# Patient Record
Sex: MALE | Race: WHITE | Employment: OTHER | URBAN - METROPOLITAN AREA
[De-identification: names, ages, dates, MRNs, and addresses within clinical notes are randomized per-mention and may not be internally consistent; named-entity substitution may affect disease eponyms.]

---

## 2017-01-17 ENCOUNTER — HOSPITAL ENCOUNTER (OUTPATIENT)
Dept: GENERAL RADIOLOGY | Facility: HOSPITAL | Age: 62
Discharge: HOME | End: 2017-01-17
Attending: NEUROLOGICAL SURGERY
Payer: COMMERCIAL

## 2017-01-17 DIAGNOSIS — Z98.1 STATUS POST CERVICAL SPINAL FUSION: ICD-10-CM

## 2017-01-17 PROCEDURE — 72040 X-RAY EXAM NECK SPINE 2-3 VW: CPT

## 2017-03-15 ENCOUNTER — MED REC SCAN ONLY (OUTPATIENT)
Dept: FAMILY MEDICINE CLINIC | Facility: CLINIC | Age: 62
End: 2017-03-15

## 2017-03-29 RX ORDER — LISINOPRIL AND HYDROCHLOROTHIAZIDE 20; 12.5 MG/1; MG/1
TABLET ORAL
Qty: 90 TABLET | Refills: 0 | Status: SHIPPED | OUTPATIENT
Start: 2017-03-29 | End: 2017-06-27

## 2017-03-29 NOTE — TELEPHONE ENCOUNTER
Last refill: 9- #90 with 1 refill    Last Visit: 10-    Next Visit: No future appointments. Forward to Dr. Raoul Lucas please advise on refills. Thanks.

## 2017-04-27 RX ORDER — ATORVASTATIN CALCIUM 10 MG/1
TABLET, FILM COATED ORAL
Qty: 30 TABLET | Refills: 6 | Status: SHIPPED | OUTPATIENT
Start: 2017-04-27 | End: 2017-12-06

## 2017-04-27 NOTE — TELEPHONE ENCOUNTER
Last refill on 10 31 2016 #30 with 5 refills  Last Lipid Panel on 2016  Total: 185  Tri  HDL: 79  LDL; 93

## 2017-05-26 ENCOUNTER — TELEPHONE (OUTPATIENT)
Dept: FAMILY MEDICINE CLINIC | Facility: CLINIC | Age: 62
End: 2017-05-26

## 2017-06-05 ENCOUNTER — MED REC SCAN ONLY (OUTPATIENT)
Dept: FAMILY MEDICINE CLINIC | Facility: CLINIC | Age: 62
End: 2017-06-05

## 2017-06-28 RX ORDER — LISINOPRIL AND HYDROCHLOROTHIAZIDE 20; 12.5 MG/1; MG/1
TABLET ORAL
Qty: 90 TABLET | Refills: 1 | Status: SHIPPED | OUTPATIENT
Start: 2017-06-28 | End: 2017-12-06

## 2017-08-16 ENCOUNTER — OFFICE VISIT (OUTPATIENT)
Dept: FAMILY MEDICINE CLINIC | Facility: CLINIC | Age: 62
End: 2017-08-16

## 2017-08-16 ENCOUNTER — HOSPITAL ENCOUNTER (OUTPATIENT)
Dept: GENERAL RADIOLOGY | Age: 62
Discharge: HOME | End: 2017-08-16
Attending: FAMILY MEDICINE
Payer: COMMERCIAL

## 2017-08-16 VITALS
DIASTOLIC BLOOD PRESSURE: 82 MMHG | HEART RATE: 80 BPM | WEIGHT: 213.19 LBS | SYSTOLIC BLOOD PRESSURE: 132 MMHG | HEIGHT: 65 IN | RESPIRATION RATE: 16 BRPM | BODY MASS INDEX: 35.52 KG/M2 | TEMPERATURE: 98 F

## 2017-08-16 DIAGNOSIS — M54.6 THORACIC BACK PAIN, UNSPECIFIED BACK PAIN LATERALITY, UNSPECIFIED CHRONICITY: Primary | ICD-10-CM

## 2017-08-16 DIAGNOSIS — M54.6 THORACIC BACK PAIN, UNSPECIFIED BACK PAIN LATERALITY, UNSPECIFIED CHRONICITY: ICD-10-CM

## 2017-08-16 PROCEDURE — 99214 OFFICE O/P EST MOD 30 MIN: CPT | Performed by: FAMILY MEDICINE

## 2017-08-16 PROCEDURE — 72072 X-RAY EXAM THORAC SPINE 3VWS: CPT | Performed by: FAMILY MEDICINE

## 2017-08-16 RX ORDER — MELOXICAM 15 MG/1
15 TABLET ORAL DAILY
Qty: 30 TABLET | Refills: 0 | Status: SHIPPED | OUTPATIENT
Start: 2017-08-16 | End: 2018-03-22 | Stop reason: ALTCHOICE

## 2017-08-16 RX ORDER — TRAMADOL HYDROCHLORIDE 50 MG/1
50 TABLET ORAL EVERY 8 HOURS PRN
Qty: 30 TABLET | Refills: 0 | Status: SHIPPED
Start: 2017-08-16 | End: 2018-03-22 | Stop reason: ALTCHOICE

## 2017-08-16 RX ORDER — CYCLOBENZAPRINE HCL 10 MG
10 TABLET ORAL 3 TIMES DAILY PRN
Qty: 30 TABLET | Refills: 0 | Status: SHIPPED
Start: 2017-08-16 | End: 2017-09-05

## 2017-08-16 NOTE — PROGRESS NOTES
María Kaur is a 65year old male.     CC:  Patient presents with:  Back Pain: per patient      HPI:  Chief Complaint: Thoracic Back Pain  Duration:  3 Month(s)  Severity: severe  Cause/ Description of Injury: unknown  Radiation of pain: up and down tablet Rfl: 6   Sildenafil Citrate 20 MG Oral Tab Take 1 tablet by mouth daily as needed.  Disp:  Rfl: 3        History:  Past Medical History:   Diagnosis Date   • Adenomatous colon polyp    • Anesthesia complication     woke up during propofol   • Arthrit Packs/day: 0.50      Years: 4.00         Quit date: 9/2/2012  Smokeless tobacco: Never Used                      Comment: social  Alcohol use: Yes           8.4 oz/week     Cans of beer: 14 per week     Comment: 1 be is moderate multilevel degenerative disc disease with multilevel anterior bridging osteophytes. There is also suggestion of superimposed DISH. PARASPINOUS:  Negative.  No paraspinous abnormality is seen.    OTHER:  Negative.           Impression     CONCLU

## 2017-08-17 ENCOUNTER — MED REC SCAN ONLY (OUTPATIENT)
Dept: FAMILY MEDICINE CLINIC | Facility: CLINIC | Age: 62
End: 2017-08-17

## 2017-08-17 ENCOUNTER — TELEPHONE (OUTPATIENT)
Dept: FAMILY MEDICINE CLINIC | Facility: CLINIC | Age: 62
End: 2017-08-17

## 2017-08-17 NOTE — TELEPHONE ENCOUNTER
Patient called to request a copy of his thoracic spine x-ray and the report. DOS 8/16/2017. Patient was called and informed that a disc was made with copies of the images and a copy of the report as well and left up front for him to .  He expressed u

## 2017-08-24 ENCOUNTER — TELEPHONE (OUTPATIENT)
Dept: FAMILY MEDICINE CLINIC | Facility: CLINIC | Age: 62
End: 2017-08-24

## 2017-08-24 NOTE — TELEPHONE ENCOUNTER
Received a request for medical records from  [address] [place] [address] for records from 01-01-16 to present. Request sent to [facility].

## 2017-08-31 ENCOUNTER — OFFICE VISIT (OUTPATIENT)
Dept: FAMILY MEDICINE CLINIC | Facility: CLINIC | Age: 62
End: 2017-08-31

## 2017-08-31 VITALS
DIASTOLIC BLOOD PRESSURE: 60 MMHG | HEART RATE: 82 BPM | BODY MASS INDEX: 36 KG/M2 | WEIGHT: 218.38 LBS | TEMPERATURE: 97 F | SYSTOLIC BLOOD PRESSURE: 112 MMHG | RESPIRATION RATE: 12 BRPM

## 2017-08-31 DIAGNOSIS — M47.814 OSTEOARTHRITIS OF THORACIC SPINE, UNSPECIFIED SPINAL OSTEOARTHRITIS COMPLICATION STATUS: ICD-10-CM

## 2017-08-31 DIAGNOSIS — M54.6 THORACIC BACK PAIN, UNSPECIFIED BACK PAIN LATERALITY, UNSPECIFIED CHRONICITY: Primary | ICD-10-CM

## 2017-08-31 PROCEDURE — 99213 OFFICE O/P EST LOW 20 MIN: CPT | Performed by: FAMILY MEDICINE

## 2017-08-31 NOTE — PROGRESS NOTES
Angela Nuñez is a 65year old male. CC:  Patient presents with:  Back Pain: per pt       HPI:  F/u thoracic back pain. He gained some relief with meds.    Allergies:  No Known Allergies   Current Meds:    Current Outpatient Prescriptions:  [place] SPINE SURGERY PROCEDURE UNLISTED      Comment: cervical fusion 4-7x2, lumbar fusions x1,                laminectomies x3  48/76/8587: UMBILICAL HERNIA REPAIR N/A      Comment: Procedure:  HERNIA UMBILICAL REPAIR ADULT;                 Surgeon: Gaurav Gomez para thoracic tenderness  NEURO: not examined     ASSESSMENT AND PLAN    1. Thoracic back pain, unspecified back pain laterality, unspecified chronicity  Prn use of NSAIDs, Pain meds and muscle relaxers  Trial of Capsaicin gel    2.  Osteoarthritis of thora

## 2017-09-07 ENCOUNTER — TELEPHONE (OUTPATIENT)
Dept: FAMILY MEDICINE CLINIC | Facility: CLINIC | Age: 62
End: 2017-09-07

## 2017-09-07 NOTE — TELEPHONE ENCOUNTER
Received request for med records from 06/01/2017 to present from [facility], Adjudicator: Carlos Manuel Robles- Phone [phone] ext [phone]. This is regarding pt's appeal to their decision about disability benefits.   Forward to [employer] today 09/07/17

## 2017-09-14 ENCOUNTER — TELEPHONE (OUTPATIENT)
Dept: FAMILY MEDICINE CLINIC | Facility: CLINIC | Age: 62
End: 2017-09-14

## 2017-09-14 NOTE — TELEPHONE ENCOUNTER
Received in today's mail, follow up to previous request for med records, this is 2nd request.  From [facility] Determination. Faxed to Med recs today and forwarded to scan [employer]. Copied for our files and sent to scanning.

## 2017-10-18 ENCOUNTER — HOSPITAL ENCOUNTER (OUTPATIENT)
Dept: GENERAL RADIOLOGY | Facility: HOSPITAL | Age: 62
Discharge: HOME | End: 2017-10-18
Attending: NEUROLOGICAL SURGERY
Payer: COMMERCIAL

## 2017-10-18 DIAGNOSIS — Z09 FOLLOW UP: ICD-10-CM

## 2017-10-18 PROCEDURE — 72052 X-RAY EXAM NECK SPINE 6/>VWS: CPT | Performed by: NEUROLOGICAL SURGERY

## 2017-12-05 ENCOUNTER — MED REC SCAN ONLY (OUTPATIENT)
Dept: FAMILY MEDICINE CLINIC | Facility: CLINIC | Age: 62
End: 2017-12-05

## 2017-12-06 ENCOUNTER — TELEPHONE (OUTPATIENT)
Dept: FAMILY MEDICINE CLINIC | Facility: CLINIC | Age: 62
End: 2017-12-06

## 2017-12-06 RX ORDER — ATORVASTATIN CALCIUM 10 MG/1
TABLET, FILM COATED ORAL
Qty: 30 TABLET | Refills: 0 | OUTPATIENT
Start: 2017-12-06

## 2017-12-06 RX ORDER — ATORVASTATIN CALCIUM 10 MG/1
TABLET, FILM COATED ORAL
Qty: 90 TABLET | Refills: 2 | Status: SHIPPED | OUTPATIENT
Start: 2017-12-06 | End: 2017-12-13

## 2017-12-06 RX ORDER — LISINOPRIL AND HYDROCHLOROTHIAZIDE 20; 12.5 MG/1; MG/1
TABLET ORAL
Qty: 90 TABLET | Refills: 2 | Status: SHIPPED | OUTPATIENT
Start: 2017-12-06 | End: 2018-08-09

## 2017-12-06 NOTE — TELEPHONE ENCOUNTER
PT WOULD LIKE REFILLS OF    LISINOPRIL-HYDROCHLOROTHIAZIDE 20-12.5 MG Oral Tab    AND     ATORVASTATIN 10 MG Oral Tab    PLEASE SEND TO [facility]  #[phone]

## 2017-12-12 NOTE — TELEPHONE ENCOUNTER
Last OV 8/31/17  Last lab 11/29/16  Last refilled 12/6/17 to mail order  Patient requesting 30 day script to local

## 2017-12-13 RX ORDER — ATORVASTATIN CALCIUM 10 MG/1
TABLET, FILM COATED ORAL
Qty: 30 TABLET | Refills: 0 | Status: SHIPPED | OUTPATIENT
Start: 2017-12-13 | End: 2018-08-10

## 2018-01-31 ENCOUNTER — MED REC SCAN ONLY (OUTPATIENT)
Dept: FAMILY MEDICINE CLINIC | Facility: CLINIC | Age: 63
End: 2018-01-31

## 2018-03-21 ENCOUNTER — TELEPHONE (OUTPATIENT)
Dept: FAMILY MEDICINE CLINIC | Facility: CLINIC | Age: 63
End: 2018-03-21

## 2018-03-21 ENCOUNTER — OFFICE VISIT (OUTPATIENT)
Dept: FAMILY MEDICINE CLINIC | Facility: CLINIC | Age: 63
End: 2018-03-21

## 2018-03-21 VITALS
HEART RATE: 72 BPM | WEIGHT: 220 LBS | BODY MASS INDEX: 36.65 KG/M2 | SYSTOLIC BLOOD PRESSURE: 128 MMHG | TEMPERATURE: 98 F | RESPIRATION RATE: 16 BRPM | HEIGHT: 65 IN | DIASTOLIC BLOOD PRESSURE: 80 MMHG

## 2018-03-21 DIAGNOSIS — L02.91 ABSCESS: Primary | ICD-10-CM

## 2018-03-21 DIAGNOSIS — M48.10 DISH (DIFFUSE IDIOPATHIC SKELETAL HYPEROSTOSIS): ICD-10-CM

## 2018-03-21 PROCEDURE — 99214 OFFICE O/P EST MOD 30 MIN: CPT | Performed by: FAMILY MEDICINE

## 2018-03-21 RX ORDER — AMOXICILLIN AND CLAVULANATE POTASSIUM 500; 125 MG/1; MG/1
TABLET, FILM COATED ORAL
Qty: 20 TABLET | Refills: 0 | Status: SHIPPED | OUTPATIENT
Start: 2018-03-21 | End: 2018-03-31

## 2018-03-21 RX ORDER — HYDROCODONE BITARTRATE AND ACETAMINOPHEN 5; 325 MG/1; MG/1
1 TABLET ORAL EVERY 8 HOURS PRN
Qty: 20 TABLET | Refills: 0 | Status: SHIPPED | OUTPATIENT
Start: 2018-03-21 | End: 2018-11-14

## 2018-03-21 NOTE — PROGRESS NOTES
Randee Bermudez is a 65year old male. CC:  Patient presents with:  Abscess (integumentary): inside of right thigh near groin per pt      HPI:  Boil in R groin region for a few days. He notes pain, redness and swelling. He denies fevers.     He also COLECTOMY      Comment: sigmoid, due to diverticular disease  No date: COLONOSCOPY  3/9/2016: COLONOSCOPY,DIAGNOSTIC N/A      Comment: Procedure: Janelle Dior POLYPECTOMY;  Surgeon: Paula Chavarria DO; S3, no S4; no click; murmur negative  PULM: clear to auscultation B, no accessory muscle use  GI: not examined  PSYCH: alert and oriented x 3; affect appropriate  SKIN: non fluctuant abscess in the R groin with surrounding erythema, edema and associated te

## 2018-03-21 NOTE — TELEPHONE ENCOUNTER
I spoke to the pt and made him an appt.      Future Appointments  Date Time Provider Teresa Paris   3/22/2018 10:00 AM DO SUSHMA Oscar

## 2018-03-21 NOTE — TELEPHONE ENCOUNTER
----- Message from Pinky Ashford MD sent at 3/21/2018  1:20 PM CDT -----  González Mcknight there,  Mr Jennyfer Anderson has a nice abscess in the R inguinal region. I am not comfortable incising this given the important underlying structures.  Can [facility] Members see him in the near future

## 2018-03-22 ENCOUNTER — OFFICE VISIT (OUTPATIENT)
Dept: SURGERY | Facility: CLINIC | Age: 63
End: 2018-03-22

## 2018-03-22 VITALS — SYSTOLIC BLOOD PRESSURE: 152 MMHG | DIASTOLIC BLOOD PRESSURE: 74 MMHG | TEMPERATURE: 98 F

## 2018-03-22 DIAGNOSIS — L02.214 ABSCESS, GROIN: Primary | ICD-10-CM

## 2018-03-22 PROCEDURE — 10060 I&D ABSCESS SIMPLE/SINGLE: CPT | Performed by: SURGERY

## 2018-03-22 PROCEDURE — 99242 OFF/OP CONSLTJ NEW/EST SF 20: CPT | Performed by: SURGERY

## 2018-03-22 NOTE — H&P
Chepe Madrid is a 65year old male  Patient presents with:  Consult: right inguinal abscess per Dr. Rinku Resendiz    Patient presents with right groin swelling for approximately 1 week.   Patient states that approximately 2 weeks ago he had date:  VASECTOMY    Current Outpatient Prescriptions on File Prior to Visit:  Amoxicillin-Pot Clavulanate (AUGMENTIN) 500-125 MG Oral Tab 1 tab bid x 10 days with food Disp: 20 tablet Rfl: 0   HYDROcodone-acetaminophen (NORCO) 5-325 MG Oral Tab Take 1 table heartburn  GENITAL/: no dysuria, urgency or frequency, no tea colored urine  MUSCULOSKELETAL: no joint complaints upper or lower extremities  HEMATOLOGY: denies hx anemia; denies bruising or excessive bleeding  Endocrine: no weight gain or loss no hot or wound irrigated and packed with quarter inch iodoform gauze sterile dressing applied      Meds & Refills for this Visit:  No prescriptions requested or ordered in this encounter    Imaging & Consults:  None

## 2018-06-18 ENCOUNTER — MED REC SCAN ONLY (OUTPATIENT)
Dept: FAMILY MEDICINE CLINIC | Facility: CLINIC | Age: 63
End: 2018-06-18

## 2018-08-10 RX ORDER — LISINOPRIL AND HYDROCHLOROTHIAZIDE 20; 12.5 MG/1; MG/1
TABLET ORAL
Qty: 90 TABLET | Refills: 0 | Status: SHIPPED | OUTPATIENT
Start: 2018-08-10 | End: 2018-11-08

## 2018-08-10 RX ORDER — ATORVASTATIN CALCIUM 10 MG/1
TABLET, FILM COATED ORAL
Qty: 90 TABLET | Refills: 0 | Status: SHIPPED | OUTPATIENT
Start: 2018-08-10 | End: 2018-11-08

## 2018-08-10 NOTE — TELEPHONE ENCOUNTER
Left detailed message advising scripts sent and he needs to set up wellness with Dr Edmundo Lorenzo and fasting lab work. Ok per  [employer] form. Advised office phone number to schedule.

## 2018-08-10 NOTE — TELEPHONE ENCOUNTER
Lisinopril hctz ast refilled on 12/6/17 for # 90 with 2 refills  Atorvastatin refilled 12/13/17 #30 with 0 refills  Last BUN 17, creatinine 1.16 on 10/28/16  Last seen on 3/21/18, /74  No future appointments. Thank you.

## 2018-08-10 NOTE — TELEPHONE ENCOUNTER
Please let patient know or leave message that his BP and lipid meds were refilled. It has been some time since we have done a full px and full panel of fasting labs on him.   Please schedule him for a wellness exam in the next month or so and come fasting for labs  Thanks

## 2018-11-08 RX ORDER — ATORVASTATIN CALCIUM 10 MG/1
TABLET, FILM COATED ORAL
Qty: 90 TABLET | Refills: 0 | Status: SHIPPED | OUTPATIENT
Start: 2018-11-08 | End: 2019-02-06

## 2018-11-08 RX ORDER — LISINOPRIL AND HYDROCHLOROTHIAZIDE 20; 12.5 MG/1; MG/1
TABLET ORAL
Qty: 90 TABLET | Refills: 0 | Status: SHIPPED | OUTPATIENT
Start: 2018-11-08 | End: 2019-02-06

## 2018-11-08 NOTE — TELEPHONE ENCOUNTER
Patient advised of the information per Dr. Fortino Madrid. Patient states that he will call the office back and set up an appointment.

## 2018-11-08 NOTE — TELEPHONE ENCOUNTER
Please let patient know or leave message that his BP and HTN meds were refilled. I have not seen him in > 1 year. Please set up a wellness exam in the near future and have him come fasting for labs at that time.  Thanks

## 2018-11-08 NOTE — TELEPHONE ENCOUNTER
Last refill on Atorvastatin #90 with 0 refills on 8 10 2018   Last refill on Lisinopril #90 with 0 refills on 810 2018  Last OV on 3 21 2018   No future appointments scheduled

## 2018-11-19 ENCOUNTER — MED REC SCAN ONLY (OUTPATIENT)
Dept: FAMILY MEDICINE CLINIC | Facility: CLINIC | Age: 63
End: 2018-11-19

## 2018-12-07 ENCOUNTER — OFFICE VISIT (OUTPATIENT)
Dept: FAMILY MEDICINE CLINIC | Facility: CLINIC | Age: 63
End: 2018-12-07
Payer: MEDICARE

## 2018-12-07 ENCOUNTER — OFFICE VISIT (OUTPATIENT)
Dept: SURGERY | Facility: CLINIC | Age: 63
End: 2018-12-07
Payer: MEDICARE

## 2018-12-07 VITALS — WEIGHT: 219 LBS | HEIGHT: 65.5 IN | TEMPERATURE: 99 F | HEART RATE: 64 BPM | BODY MASS INDEX: 36.05 KG/M2

## 2018-12-07 VITALS
OXYGEN SATURATION: 98 % | HEIGHT: 65.5 IN | HEART RATE: 62 BPM | DIASTOLIC BLOOD PRESSURE: 84 MMHG | SYSTOLIC BLOOD PRESSURE: 132 MMHG | WEIGHT: 219 LBS | TEMPERATURE: 98 F | BODY MASS INDEX: 36.05 KG/M2

## 2018-12-07 DIAGNOSIS — E78.5 HYPERLIPIDEMIA, UNSPECIFIED HYPERLIPIDEMIA TYPE: ICD-10-CM

## 2018-12-07 DIAGNOSIS — I10 ESSENTIAL HYPERTENSION: ICD-10-CM

## 2018-12-07 DIAGNOSIS — K59.00 CONSTIPATION, UNSPECIFIED CONSTIPATION TYPE: ICD-10-CM

## 2018-12-07 DIAGNOSIS — R10.30 LOWER ABDOMINAL PAIN: ICD-10-CM

## 2018-12-07 DIAGNOSIS — R19.4 CHANGE IN BOWEL HABITS: Primary | ICD-10-CM

## 2018-12-07 DIAGNOSIS — R77.8 ELEVATED TOTAL PROTEIN: ICD-10-CM

## 2018-12-07 DIAGNOSIS — N50.812 LEFT TESTICULAR PAIN: ICD-10-CM

## 2018-12-07 DIAGNOSIS — Z00.00 MEDICARE ANNUAL WELLNESS VISIT, INITIAL: Primary | ICD-10-CM

## 2018-12-07 DIAGNOSIS — N50.89 TESTICLE LUMP: ICD-10-CM

## 2018-12-07 DIAGNOSIS — C61 PROSTATE CANCER (HCC): ICD-10-CM

## 2018-12-07 LAB — PROSTATE SPECIFIC AG: <0.01 NG/ML

## 2018-12-07 PROCEDURE — 85027 COMPLETE CBC AUTOMATED: CPT | Performed by: FAMILY MEDICINE

## 2018-12-07 PROCEDURE — 80053 COMPREHEN METABOLIC PANEL: CPT | Performed by: FAMILY MEDICINE

## 2018-12-07 PROCEDURE — 99214 OFFICE O/P EST MOD 30 MIN: CPT | Performed by: SURGERY

## 2018-12-07 PROCEDURE — 90686 IIV4 VACC NO PRSV 0.5 ML IM: CPT | Performed by: FAMILY MEDICINE

## 2018-12-07 PROCEDURE — 80061 LIPID PANEL: CPT | Performed by: FAMILY MEDICINE

## 2018-12-07 PROCEDURE — 36415 COLL VENOUS BLD VENIPUNCTURE: CPT | Performed by: FAMILY MEDICINE

## 2018-12-07 PROCEDURE — G0402 INITIAL PREVENTIVE EXAM: HCPCS | Performed by: FAMILY MEDICINE

## 2018-12-07 PROCEDURE — 84443 ASSAY THYROID STIM HORMONE: CPT | Performed by: FAMILY MEDICINE

## 2018-12-07 PROCEDURE — 90670 PCV13 VACCINE IM: CPT | Performed by: FAMILY MEDICINE

## 2018-12-07 PROCEDURE — G0008 ADMIN INFLUENZA VIRUS VAC: HCPCS | Performed by: FAMILY MEDICINE

## 2018-12-07 PROCEDURE — G0009 ADMIN PNEUMOCOCCAL VACCINE: HCPCS | Performed by: FAMILY MEDICINE

## 2018-12-07 NOTE — PROGRESS NOTES
Ade Player is a 59year old male.     CC:  Patient presents with:  Physical: per pt -medicare      HPI:  Yearly PX  Last lipid: see below  Last PSA:  See below    Component      Latest Ref Rng & Units 11/29/2016   CHOLESTEROL, TOTAL      <200 mg/dL or a female age 47-67, do you take aspirin?: Yes    Have you had any immunizations at another office such as Influenza, Hepatitis B, Tetanus, or Pneumococcal?: No     Functional Ability     Bathing or Showering: Able without help    Toileting: Able without Medications:  LISINOPRIL-HYDROCHLOROTHIAZIDE 20-12.5 MG Oral Tab TAKE 1 TABLET EVERY MORNING Disp: 90 tablet Rfl: 0   ATORVASTATIN 10 MG Oral Tab TAKE 1 TABLET EVERY EVENING Disp: 90 tablet Rfl: 0   Capsaicin 0.075 % External Gel Apply to affected area bid Family Status   Relation Status   • Fa  at age 68        Heart disease   • Mo Alive      Social History    Tobacco Use      Smoking status: Former Smoker        Packs/day: 0.50        Years: 4.00        Pack years: 2        Quit date: 2012 meeting with a family  to draft a living will and health care power of   Discussed strategies to minimize risk of falls  Await results   Colonoscopy in 2021, sooner if deemed necessary by his Surgeon  - VENIPUNCTURE  - CBC, PLATELET; NO DIF 12/7/2019      TSH W Reflex To Free T4 [E]          Standing Status: Future          Standing Expiration Date: 12/7/2019      Flulaval 6 months and older 0.5 ml Quad PF [08354]      Pneumococcal (Prevnar 13) (16601) (DX V03.82/Z23)      *Venipuncture

## 2018-12-07 NOTE — H&P
Chauncey Rodgers is a 59year old male  Patient presents with:  Abdominal Pain: Est patient, History of colon resection done 2014. c/o lower abdominal pain and constipation--took stool softner. Denies any nausea or vomiting.  Last colon colonoscopy done 20 Outpatient Medications on File Prior to Visit:  LISINOPRIL-HYDROCHLOROTHIAZIDE 20-12.5 MG Oral Tab TAKE 1 TABLET EVERY MORNING Disp: 90 tablet Rfl: 0   ATORVASTATIN 10 MG Oral Tab TAKE 1 TABLET EVERY EVENING Disp: 90 tablet Rfl: 0   Capsaicin 0.075 % Exter intolerance    EXAM     Pulse 64, temperature 98.7 °F (37.1 °C), temperature source Oral, height 5' 5.5\" (1.664 m), weight 219 lb (99.3 kg). GENERAL: well developed, well nourished male, in no apparent distress.     MENTAL STATUS :Alert, oriented x 3  PSY National     Cholesterol Education III Guidelines:     Desirable:         < 130 mg/dL     Borderline high:   139-159 mg/dL     High:              160-189 mg/dL          Very high:         > or = 190 mg/dL       • AST 11/29/2016 13* 15 - 41 U/L Final   • Al

## 2018-12-12 ENCOUNTER — NURSE ONLY (OUTPATIENT)
Dept: FAMILY MEDICINE CLINIC | Facility: CLINIC | Age: 63
End: 2018-12-12
Payer: MEDICARE

## 2018-12-12 ENCOUNTER — MED REC SCAN ONLY (OUTPATIENT)
Dept: FAMILY MEDICINE CLINIC | Facility: CLINIC | Age: 63
End: 2018-12-12

## 2018-12-12 DIAGNOSIS — R77.8 ELEVATED TOTAL PROTEIN: ICD-10-CM

## 2018-12-12 PROCEDURE — 84165 PROTEIN E-PHORESIS SERUM: CPT | Performed by: FAMILY MEDICINE

## 2018-12-12 PROCEDURE — 36415 COLL VENOUS BLD VENIPUNCTURE: CPT | Performed by: FAMILY MEDICINE

## 2018-12-12 PROCEDURE — 86334 IMMUNOFIX E-PHORESIS SERUM: CPT | Performed by: FAMILY MEDICINE

## 2018-12-12 PROCEDURE — 83883 ASSAY NEPHELOMETRY NOT SPEC: CPT | Performed by: FAMILY MEDICINE

## 2019-01-14 ENCOUNTER — MED REC SCAN ONLY (OUTPATIENT)
Dept: FAMILY MEDICINE CLINIC | Facility: CLINIC | Age: 64
End: 2019-01-14

## 2019-02-06 RX ORDER — LISINOPRIL AND HYDROCHLOROTHIAZIDE 20; 12.5 MG/1; MG/1
TABLET ORAL
Qty: 90 TABLET | Refills: 2 | Status: SHIPPED | OUTPATIENT
Start: 2019-02-06 | End: 2019-11-03

## 2019-02-06 RX ORDER — ATORVASTATIN CALCIUM 10 MG/1
TABLET, FILM COATED ORAL
Qty: 90 TABLET | Refills: 2 | Status: SHIPPED | OUTPATIENT
Start: 2019-02-06 | End: 2019-08-02

## 2019-02-06 NOTE — TELEPHONE ENCOUNTER
Last refill on Lisinopril- HCTZ- #90 with 0 refills on 11 8 2018                                                Last refill on Atorvastatin #90 with 0 refills on 11 8 2018   Last OV on 12 7 2018

## 2019-07-19 ENCOUNTER — OFFICE VISIT (OUTPATIENT)
Dept: FAMILY MEDICINE CLINIC | Facility: CLINIC | Age: 64
End: 2019-07-19
Payer: MEDICARE

## 2019-07-19 VITALS
HEART RATE: 74 BPM | TEMPERATURE: 98 F | WEIGHT: 211.81 LBS | SYSTOLIC BLOOD PRESSURE: 134 MMHG | OXYGEN SATURATION: 99 % | BODY MASS INDEX: 33.25 KG/M2 | HEIGHT: 67 IN | DIASTOLIC BLOOD PRESSURE: 80 MMHG | RESPIRATION RATE: 12 BRPM

## 2019-07-19 DIAGNOSIS — E78.5 HYPERLIPIDEMIA, UNSPECIFIED HYPERLIPIDEMIA TYPE: ICD-10-CM

## 2019-07-19 DIAGNOSIS — E04.1 THYROID NODULE: Primary | ICD-10-CM

## 2019-07-19 PROCEDURE — 99214 OFFICE O/P EST MOD 30 MIN: CPT | Performed by: FAMILY MEDICINE

## 2019-07-19 NOTE — PROGRESS NOTES
Gurdeep Sharma is a 62year old male. CC:  Patient presents with:   Follow - Up: per pt- from mri and go over lab results      HPI:  Found to have a L thyroid nodule incidentally on MRI cervical spine:  Epic, Interface Incoming Radiology Results - 0 <130 mg/dL 109 106 100    Total Cholesterol      <200 mg/dL    249   AST          20   ALT (SGPT)          29     Component      Latest Ref Rng & Units 9/12/2014   Cholesterol, Total      <200 mg/dL 224 (H)   Triglycerides      30 - 149 mg/dL 81   HDL Chol DO [place] [facility]   • IR EPIDURAL STERIOD INJECTION      neck   • OTHER SURGICAL HISTORY      prostatectomy   • SPINE SURGERY PROCEDURE UNLISTED      cervical fusion 4-7x2, lumbar fusions x1, laminectomies x3   • VASECTOMY        Family History   P applicable  EXTREMITIES: No clubbing, cyanosis or edema  RECTAL: not examined  GENITAL: not examined  LYMPH: no supraclavicular nodes  MUSCULOSKELETAL: normal ambulation  NEURO: ---     ASSESSMENT AND PLAN    1.  Thyroid nodule  Await results   - US THYROID

## 2019-07-25 ENCOUNTER — MED REC SCAN ONLY (OUTPATIENT)
Dept: FAMILY MEDICINE CLINIC | Facility: CLINIC | Age: 64
End: 2019-07-25

## 2019-08-02 ENCOUNTER — TELEPHONE (OUTPATIENT)
Dept: FAMILY MEDICINE CLINIC | Facility: CLINIC | Age: 64
End: 2019-08-02

## 2019-08-02 DIAGNOSIS — E78.5 HYPERLIPIDEMIA, UNSPECIFIED HYPERLIPIDEMIA TYPE: Primary | ICD-10-CM

## 2019-08-02 RX ORDER — ATORVASTATIN CALCIUM 20 MG/1
20 TABLET, FILM COATED ORAL NIGHTLY
Qty: 90 TABLET | Refills: 0 | Status: SHIPPED | OUTPATIENT
Start: 2019-08-02 | End: 2019-10-13

## 2019-08-02 NOTE — TELEPHONE ENCOUNTER
Well, that's an evaluation that needs to be done with Urology. Blood in the urine is never normal. I do not utilize the Urology group at [facility].   I like:  [facility]  Tel: [address] [facility]   Tel: [phone]

## 2019-08-02 NOTE — TELEPHONE ENCOUNTER
Please let patient know or leave message that his thyroid u/s from [facility] done 8/1/19 does show a fairly good sized L thyroid nodule measuring about one inch in greatest diameter. This needs to be evaluated further.  I would like him to see ENT as this is like

## 2019-08-02 NOTE — TELEPHONE ENCOUNTER
Absolutely.   [facility]   Tel: [phone]   He should get a disc copy of the u/s to take to the consult  Thanks

## 2019-08-02 NOTE — TELEPHONE ENCOUNTER
Patient advised. Verbalizes understanding. Also, states that he had some blood in urine this morning after pushing while having a bowel movement. Had a tinge of pain in penis and notice blood. No pain at this time.   States that he thinks that the bl

## 2019-08-02 NOTE — TELEPHONE ENCOUNTER
Please let patient know or leave message that his CT score done at [facility] on 8/1/2019 is 281.3. This indicates a moderate amount of plaque build up in his coronary arteries and a moderate risk for future coronary disease.  I know he wanted to come off the Lipi

## 2019-08-02 NOTE — TELEPHONE ENCOUNTER
Patient advised. Verbalizes understanding. [facility] sent to [facility]. Future orders placed for labs.    Recall in Epic for CT of chest.

## 2019-08-02 NOTE — TELEPHONE ENCOUNTER
Patient advised. States that he sees other specialists at [place]. Wants to know if Dr. Nicci Naranjo has a recommendation for a [place] doctor.

## 2019-08-06 ENCOUNTER — TELEPHONE (OUTPATIENT)
Dept: FAMILY MEDICINE CLINIC | Facility: CLINIC | Age: 64
End: 2019-08-06

## 2019-08-06 NOTE — TELEPHONE ENCOUNTER
PT WAS SEEN TODAY AT [facility] FOR SOB. RAN SOME TESTS AND ADV F/U WITH IN 3 DAYS.     LOOKING TO SEE IF WE CAN GET PT SQUEEZED IN SOMEWHERE ON Friday OR CAN WE USE HFU SLOTS    PLEASE ADV      THANK YOU

## 2019-08-09 ENCOUNTER — OFFICE VISIT (OUTPATIENT)
Dept: FAMILY MEDICINE CLINIC | Facility: CLINIC | Age: 64
End: 2019-08-09
Payer: MEDICARE

## 2019-08-09 VITALS
SYSTOLIC BLOOD PRESSURE: 138 MMHG | RESPIRATION RATE: 16 BRPM | HEART RATE: 70 BPM | OXYGEN SATURATION: 98 % | TEMPERATURE: 98 F | BODY MASS INDEX: 33 KG/M2 | WEIGHT: 210.38 LBS | DIASTOLIC BLOOD PRESSURE: 84 MMHG

## 2019-08-09 DIAGNOSIS — R06.00 DYSPNEA, UNSPECIFIED TYPE: Primary | ICD-10-CM

## 2019-08-09 DIAGNOSIS — E78.5 HYPERLIPIDEMIA, UNSPECIFIED HYPERLIPIDEMIA TYPE: ICD-10-CM

## 2019-08-09 DIAGNOSIS — R93.1 ABNORMAL HEART SCORE CT: ICD-10-CM

## 2019-08-09 DIAGNOSIS — R07.9 CHEST PAIN, UNSPECIFIED TYPE: ICD-10-CM

## 2019-08-09 PROCEDURE — 1111F DSCHRG MED/CURRENT MED MERGE: CPT | Performed by: FAMILY MEDICINE

## 2019-08-09 PROCEDURE — 99214 OFFICE O/P EST MOD 30 MIN: CPT | Performed by: FAMILY MEDICINE

## 2019-08-09 NOTE — PROGRESS NOTES
Gabriela Sierra is a 62year old male. CC:  Patient presents with:  Hospital F/U: per pt- breathing problems      HPI:  F/u ER visit to [facility] in [place] on 8/6/19. He went to ER because he acutely awoke with SOB, and feeling of chest tightness.  He attr Laterality Date   • APPENDECTOMY     • BACK SURGERY      lumbar and cervical disc surgery   • COLECTOMY      sigmoid, due to diverticular disease   • COLON RESECTION LOW ANTERIOR (SIGMOID) N/A 11/10/2014    Performed by Levon Newton DO at [facility]   • Al Andrea M.D.     Physical Exam:  GEN: well developed, well nourished, in no apparent distress  EYE: B conjunctiva and lids normal  HENT: normocephalic; normal nose, pharynx and TM's  NECK: no LAD  CAR: S1, S2 normal, RRR; no S3, no S4; no click; murmur neg

## 2019-08-21 ENCOUNTER — MED REC SCAN ONLY (OUTPATIENT)
Dept: FAMILY MEDICINE CLINIC | Facility: CLINIC | Age: 64
End: 2019-08-21

## 2019-08-27 ENCOUNTER — PATIENT OUTREACH (OUTPATIENT)
Dept: FAMILY MEDICINE CLINIC | Facility: CLINIC | Age: 64
End: 2019-08-27

## 2019-08-28 ENCOUNTER — HOSPITAL ENCOUNTER (OUTPATIENT)
Dept: CV DIAGNOSTICS | Age: 64
Discharge: HOME | End: 2019-08-28
Attending: FAMILY MEDICINE
Payer: MEDICARE

## 2019-08-28 DIAGNOSIS — R07.9 CHEST PAIN, UNSPECIFIED TYPE: ICD-10-CM

## 2019-08-28 DIAGNOSIS — E78.5 HYPERLIPIDEMIA, UNSPECIFIED HYPERLIPIDEMIA TYPE: ICD-10-CM

## 2019-08-28 DIAGNOSIS — R06.00 DYSPNEA, UNSPECIFIED TYPE: ICD-10-CM

## 2019-08-28 DIAGNOSIS — R93.1 ABNORMAL HEART SCORE CT: ICD-10-CM

## 2019-08-28 PROCEDURE — 93350 STRESS TTE ONLY: CPT | Performed by: FAMILY MEDICINE

## 2019-08-28 PROCEDURE — 93017 CV STRESS TEST TRACING ONLY: CPT | Performed by: FAMILY MEDICINE

## 2019-08-28 PROCEDURE — 93018 CV STRESS TEST I&R ONLY: CPT | Performed by: FAMILY MEDICINE

## 2019-09-12 LAB — AMB EXT PSA SCREEN: 0.1 NG/ML

## 2019-09-13 ENCOUNTER — MED REC SCAN ONLY (OUTPATIENT)
Dept: FAMILY MEDICINE CLINIC | Facility: CLINIC | Age: 64
End: 2019-09-13

## 2019-10-04 ENCOUNTER — TELEPHONE (OUTPATIENT)
Dept: FAMILY MEDICINE CLINIC | Facility: CLINIC | Age: 64
End: 2019-10-04

## 2019-10-04 NOTE — TELEPHONE ENCOUNTER
Patient had an [address] done recently at 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 It measures 4.6 x 3.6 cm and is medial to the left kidney.  Radiologist wants the patient to check in with PCP and see if he wants wan

## 2019-10-04 NOTE — TELEPHONE ENCOUNTER
I have no access to this ultrasound. Whoever ordered it should review results. However if it is the same lipoma that was seen in 2014 then go see Dr. Lorraine Duarte.   Thanks

## 2019-10-04 NOTE — TELEPHONE ENCOUNTER
Patient advised of the information per Dr. Margarita Madison. Phone number for Dr. Michaela Viera provided to patient.

## 2019-10-14 RX ORDER — ATORVASTATIN CALCIUM 20 MG/1
TABLET, FILM COATED ORAL
Qty: 90 TABLET | Refills: 4 | Status: SHIPPED | OUTPATIENT
Start: 2019-10-14 | End: 2021-01-06

## 2019-10-29 ENCOUNTER — OFFICE VISIT (OUTPATIENT)
Dept: SURGERY | Facility: CLINIC | Age: 64
End: 2019-10-29
Payer: MEDICARE

## 2019-10-29 VITALS — WEIGHT: 205 LBS | TEMPERATURE: 99 F | BODY MASS INDEX: 32.56 KG/M2 | HEIGHT: 66.5 IN | HEART RATE: 85 BPM

## 2019-10-29 DIAGNOSIS — N28.89 MASS OF KIDNEY: Primary | ICD-10-CM

## 2019-10-29 PROCEDURE — 99213 OFFICE O/P EST LOW 20 MIN: CPT | Performed by: SURGERY

## 2019-10-29 RX ORDER — MELOXICAM 15 MG/1
1 TABLET ORAL
COMMUNITY
Start: 2019-09-10 | End: 2020-01-23

## 2019-11-03 NOTE — PROGRESS NOTES
[facility]  Progress Note    Evijeffrey Lindquist Patient Status:  No patient class for patient encounter    1955 MRN [id number]   Location  [place] [address] Attending No att. providers found   Hosp Day # 0 PCP Savanna Danielson for discussion. This seems reasonable. I spent  45 minutes face to face with the patient. More than 50% of that time was spent counseling the patient and/or on coordination of care.  The diagnosis, prognosis, and general treatment was explained to the pat

## 2019-11-04 RX ORDER — LISINOPRIL AND HYDROCHLOROTHIAZIDE 20; 12.5 MG/1; MG/1
TABLET ORAL
Qty: 90 TABLET | Refills: 1 | Status: SHIPPED | OUTPATIENT
Start: 2019-11-04 | End: 2020-05-01

## 2019-11-13 ENCOUNTER — MED REC SCAN ONLY (OUTPATIENT)
Dept: FAMILY MEDICINE CLINIC | Facility: CLINIC | Age: 64
End: 2019-11-13

## 2019-12-12 ENCOUNTER — MED REC SCAN ONLY (OUTPATIENT)
Dept: FAMILY MEDICINE CLINIC | Facility: CLINIC | Age: 64
End: 2019-12-12

## 2020-01-15 ENCOUNTER — TELEPHONE (OUTPATIENT)
Dept: FAMILY MEDICINE CLINIC | Facility: CLINIC | Age: 65
End: 2020-01-15

## 2020-01-15 NOTE — TELEPHONE ENCOUNTER
Pt called he is having pain to the right of his belly button. It is only when he is standing it doesn't hurt when laying down. His pain is at 3/4. It has been going on for about a month. He is wondering if he should see  or Dr. Malia Leon about this?

## 2020-01-15 NOTE — TELEPHONE ENCOUNTER
Let's have him see Dr. Марина Murcia. Mo Peña has had appendectomy and umbilical hernia repair. He may have some internal scar tissue called adhesions.    THANKS

## 2020-01-23 ENCOUNTER — OFFICE VISIT (OUTPATIENT)
Dept: FAMILY MEDICINE CLINIC | Facility: CLINIC | Age: 65
End: 2020-01-23
Payer: MEDICARE

## 2020-01-23 VITALS
WEIGHT: 213.38 LBS | SYSTOLIC BLOOD PRESSURE: 138 MMHG | OXYGEN SATURATION: 98 % | DIASTOLIC BLOOD PRESSURE: 88 MMHG | HEIGHT: 65.5 IN | RESPIRATION RATE: 16 BRPM | HEART RATE: 66 BPM | TEMPERATURE: 98 F | BODY MASS INDEX: 35.12 KG/M2

## 2020-01-23 DIAGNOSIS — M47.812 FACET ARTHRITIS OF CERVICAL REGION: ICD-10-CM

## 2020-01-23 DIAGNOSIS — I10 ESSENTIAL HYPERTENSION: ICD-10-CM

## 2020-01-23 DIAGNOSIS — M47.816 ARTHRITIS OF FACET JOINT OF LUMBAR SPINE: ICD-10-CM

## 2020-01-23 DIAGNOSIS — E78.5 HYPERLIPIDEMIA, UNSPECIFIED HYPERLIPIDEMIA TYPE: ICD-10-CM

## 2020-01-23 DIAGNOSIS — Z00.00 MEDICARE ANNUAL WELLNESS VISIT, INITIAL: Primary | ICD-10-CM

## 2020-01-23 LAB
CHOLEST SMN-MCNC: 181 MG/DL (ref ?–200)
HDLC SERPL-MCNC: 77 MG/DL (ref 40–59)
LDLC SERPL CALC-MCNC: 92 MG/DL (ref ?–100)
NONHDLC SERPL-MCNC: 104 MG/DL (ref ?–130)
PATIENT FASTING Y/N/NP: YES
TRIGL SERPL-MCNC: 60 MG/DL (ref 30–149)
TSI SER-ACNC: 1.18 MIU/ML (ref 0.36–3.74)
VLDLC SERPL CALC-MCNC: 12 MG/DL (ref 0–30)

## 2020-01-23 PROCEDURE — 90686 IIV4 VACC NO PRSV 0.5 ML IM: CPT | Performed by: FAMILY MEDICINE

## 2020-01-23 PROCEDURE — 99213 OFFICE O/P EST LOW 20 MIN: CPT | Performed by: FAMILY MEDICINE

## 2020-01-23 PROCEDURE — G0008 ADMIN INFLUENZA VIRUS VAC: HCPCS | Performed by: FAMILY MEDICINE

## 2020-01-23 PROCEDURE — 84443 ASSAY THYROID STIM HORMONE: CPT | Performed by: FAMILY MEDICINE

## 2020-01-23 PROCEDURE — G0438 PPPS, INITIAL VISIT: HCPCS | Performed by: FAMILY MEDICINE

## 2020-01-23 PROCEDURE — 80061 LIPID PANEL: CPT | Performed by: FAMILY MEDICINE

## 2020-01-23 RX ORDER — SILDENAFIL 25 MG/1
25 TABLET, FILM COATED ORAL
COMMUNITY
Start: 2019-09-12

## 2020-01-23 NOTE — PROGRESS NOTES
Phyllis José is a 62year old male.     CC:  Patient presents with:  Physical: per pt- medicare      HPI:  Yearly PX    Last lipid:  Lab Results   Component Value Date    CHOLEST 187 12/07/2018    TRIG 64 12/07/2018    HDL 78 (H) 12/07/2018    LDL 96  12/07/2018    CO2 26.0 12/07/2018    OSMOCALC 288 12/07/2018       BP Readings from Last 3 Encounters:  01/23/20 : 138/90  08/09/19 : 138/84  07/19/19 : 134/80             General Health     In the past six months, have you lost more than 10 pounds you have a living will?: No     Hearing Assessment (Required for AWV/SWV)      Hearing Screening    Time taken:  1/23/2020 10:10 AM  Entry User:  Kota Duarte MD  Screening Method:  Finger Rub  Finger Rub Result:  Pass             Visual Acuity     Righ ANTERIOR (SIGMOID) N/A 11/10/2014    Performed by Levon Newton DO at [address]   • COLONOSCOPY     • COLONOSCOPY, POSSIBLE BIOPSY, POSSIBLE POLYPECTOMY [address] N/A 1/16/2019    Performed by Levon Newton DO at [address] [facility] [facility] kg/m²     Reviewed by Alex Adam M.D.     Physical Exam:  GEN: well developed, well nourished, in no apparent distress  EYE: B conjunctiva and lids normal  HENT: normocephalic; normal nose, pharynx and TM's  NECK: No lymphadenopathy, thyromegaly or masses TSH W Reflex To Free T4 [E]          Standing Status: Future          Number of Occurrences: 1          Standing Expiration Date: 1/23/2021      *Venipuncture      None    Meds & Refills for this Visit:  Requested Prescriptions      No prescriptions reques

## 2020-01-31 ENCOUNTER — MED REC SCAN ONLY (OUTPATIENT)
Dept: FAMILY MEDICINE CLINIC | Facility: CLINIC | Age: 65
End: 2020-01-31

## 2020-02-08 NOTE — PROGRESS NOTES
Lucas De Luna is a 62year old male. CC:  No chief complaint on file.       HPI:  I am seeing Lucas De Luna for preoperative evaluation at the request of Dr. Quan Mendoza MD for my evaluation of the patient's medical problems prior to the propos a peak heart rate of 144 beats per minute (92% of their APMHR) and a peak   blood pressure of 186/82 mmHg. There were 8.2 METs achieved, which is   below average for their age. The EKG was negative for ischemia.  Few   isolated PAC's and one ventricular heart score   • Unspecified essential hypertension       Past Surgical History:   Procedure Laterality Date   • APPENDECTOMY     • BACK SURGERY      lumbar and cervical disc surgery   • COLECTOMY      sigmoid, due to diverticular disease   • COLON RESECTIO discharge, hoarseness  Cardiovascular/Pulses: Denies palpitations, tachycardia, irregular heart beat, chest pain, exertional chest pain or pressure, paroxysmal nocturnal dyspnea, orthopnea, lower extremity edema  Respiratory: Denies cough, dyspnea on exert rupture of lef*     COMPARISON:  [place], XR, XR CHEST PA + LAT CHEST (CPT=71020), 10/28/2016, 12:06. [place], XR, XR CHEST PA + LAT CHEST (CPT=71020), 10/31/2014, 14:57. TECHNIQUE:  PA and lateral chest radiographs were obtained.      PATIENT STAT mmol/L 27.0   ANION GAP      0 - 18 mmol/L 5   BUN      7 - 18 mg/dL 14   CREATININE      0.70 - 1.30 mg/dL 1.06   BUN/CREAT Ratio      10.0 - 20.0 13.2   CALCIUM      8.5 - 10.1 mg/dL 9.3   CALCULATED OSMOLALITY      275 - 295 mOsm/kg 290   eGFR NON-AFR. CHEST PA + LAT CHEST (CPT=71046); Future    6. Biceps tendinopathy, left  As above   - VENIPUNCTURE  - CBC WITH DIFFERENTIAL WITH PLATELET; Future  - COMP METABOLIC PANEL (14); Future  - ELECTROCARDIOGRAM, COMPLETE  - XR CHEST PA + LAT CHEST (ACX=79839);  Tino Chew

## 2020-02-10 ENCOUNTER — HOSPITAL ENCOUNTER (OUTPATIENT)
Dept: GENERAL RADIOLOGY | Age: 65
Discharge: HOME | End: 2020-02-10
Attending: FAMILY MEDICINE
Payer: MEDICARE

## 2020-02-10 ENCOUNTER — OFFICE VISIT (OUTPATIENT)
Dept: FAMILY MEDICINE CLINIC | Facility: CLINIC | Age: 65
End: 2020-02-10
Payer: MEDICARE

## 2020-02-10 VITALS
SYSTOLIC BLOOD PRESSURE: 124 MMHG | BODY MASS INDEX: 35 KG/M2 | DIASTOLIC BLOOD PRESSURE: 80 MMHG | WEIGHT: 211.81 LBS | RESPIRATION RATE: 10 BRPM | HEART RATE: 68 BPM | TEMPERATURE: 97 F

## 2020-02-10 DIAGNOSIS — M75.42 IMPINGEMENT SYNDROME OF SHOULDER, LEFT: ICD-10-CM

## 2020-02-10 DIAGNOSIS — I10 ESSENTIAL HYPERTENSION: ICD-10-CM

## 2020-02-10 DIAGNOSIS — M19.012 ARTHRITIS OF LEFT ACROMIOCLAVICULAR JOINT: ICD-10-CM

## 2020-02-10 DIAGNOSIS — M75.112 INCOMPLETE TEAR OF LEFT ROTATOR CUFF, UNSPECIFIED WHETHER TRAUMATIC: ICD-10-CM

## 2020-02-10 DIAGNOSIS — F41.0 PANIC ATTACKS: ICD-10-CM

## 2020-02-10 DIAGNOSIS — M67.922 BICEPS TENDINOPATHY, LEFT: ICD-10-CM

## 2020-02-10 DIAGNOSIS — M25.512 CHRONIC LEFT SHOULDER PAIN: ICD-10-CM

## 2020-02-10 DIAGNOSIS — G89.29 CHRONIC LEFT SHOULDER PAIN: ICD-10-CM

## 2020-02-10 DIAGNOSIS — Z01.818 PREOP EXAMINATION: ICD-10-CM

## 2020-02-10 DIAGNOSIS — Z01.818 PREOP EXAMINATION: Primary | ICD-10-CM

## 2020-02-10 LAB
ALBUMIN SERPL-MCNC: 4.5 G/DL (ref 3.4–5)
ALBUMIN/GLOB SERPL: 1.2 {RATIO} (ref 1–2)
ALP LIVER SERPL-CCNC: 68 U/L (ref 45–117)
ALT SERPL-CCNC: 25 U/L (ref 16–61)
ANION GAP SERPL CALC-SCNC: 5 MMOL/L (ref 0–18)
AST SERPL-CCNC: 15 U/L (ref 15–37)
BASOPHILS # BLD AUTO: 0.07 X10(3) UL (ref 0–0.2)
BASOPHILS NFR BLD AUTO: 1 %
BILIRUB SERPL-MCNC: 0.5 MG/DL (ref 0.1–2)
BUN BLD-MCNC: 14 MG/DL (ref 7–18)
BUN/CREAT SERPL: 13.2 (ref 10–20)
CALCIUM BLD-MCNC: 9.3 MG/DL (ref 8.5–10.1)
CHLORIDE SERPL-SCNC: 108 MMOL/L (ref 98–112)
CO2 SERPL-SCNC: 27 MMOL/L (ref 21–32)
CREAT BLD-MCNC: 1.06 MG/DL (ref 0.7–1.3)
DEPRECATED RDW RBC AUTO: 41.8 FL (ref 35.1–46.3)
EOSINOPHIL # BLD AUTO: 0.09 X10(3) UL (ref 0–0.7)
EOSINOPHIL NFR BLD AUTO: 1.3 %
ERYTHROCYTE [DISTWIDTH] IN BLOOD BY AUTOMATED COUNT: 12.5 % (ref 11–15)
GLOBULIN PLAS-MCNC: 3.7 G/DL (ref 2.8–4.4)
GLUCOSE BLD-MCNC: 90 MG/DL (ref 70–99)
HCT VFR BLD AUTO: 44.7 % (ref 39–53)
HGB BLD-MCNC: 14.7 G/DL (ref 13–17.5)
IMM GRANULOCYTES # BLD AUTO: 0.01 X10(3) UL (ref 0–1)
IMM GRANULOCYTES NFR BLD: 0.1 %
LYMPHOCYTES # BLD AUTO: 1.46 X10(3) UL (ref 1–4)
LYMPHOCYTES NFR BLD AUTO: 20.9 %
M PROTEIN MFR SERPL ELPH: 8.2 G/DL (ref 6.4–8.2)
MCH RBC QN AUTO: 30.2 PG (ref 26–34)
MCHC RBC AUTO-ENTMCNC: 32.9 G/DL (ref 31–37)
MCV RBC AUTO: 92 FL (ref 80–100)
MONOCYTES # BLD AUTO: 0.75 X10(3) UL (ref 0.1–1)
MONOCYTES NFR BLD AUTO: 10.7 %
NEUTROPHILS # BLD AUTO: 4.62 X10 (3) UL (ref 1.5–7.7)
NEUTROPHILS # BLD AUTO: 4.62 X10(3) UL (ref 1.5–7.7)
NEUTROPHILS NFR BLD AUTO: 66 %
OSMOLALITY SERPL CALC.SUM OF ELEC: 290 MOSM/KG (ref 275–295)
PATIENT FASTING Y/N/NP: YES
PLATELET # BLD AUTO: 271 10(3)UL (ref 150–450)
POTASSIUM SERPL-SCNC: 4.5 MMOL/L (ref 3.5–5.1)
RBC # BLD AUTO: 4.86 X10(6)UL (ref 4.3–5.7)
SODIUM SERPL-SCNC: 140 MMOL/L (ref 136–145)
WBC # BLD AUTO: 7 X10(3) UL (ref 4–11)

## 2020-02-10 PROCEDURE — 71046 X-RAY EXAM CHEST 2 VIEWS: CPT | Performed by: FAMILY MEDICINE

## 2020-02-10 PROCEDURE — 93000 ELECTROCARDIOGRAM COMPLETE: CPT | Performed by: FAMILY MEDICINE

## 2020-02-10 PROCEDURE — 85025 COMPLETE CBC W/AUTO DIFF WBC: CPT | Performed by: FAMILY MEDICINE

## 2020-02-10 PROCEDURE — 80053 COMPREHEN METABOLIC PANEL: CPT | Performed by: FAMILY MEDICINE

## 2020-02-10 PROCEDURE — 99214 OFFICE O/P EST MOD 30 MIN: CPT | Performed by: FAMILY MEDICINE

## 2020-02-10 RX ORDER — ALPRAZOLAM 1 MG/1
1 TABLET ORAL
Qty: 10 TABLET | Refills: 0 | Status: SHIPPED | OUTPATIENT
Start: 2020-02-10 | End: 2020-08-14 | Stop reason: ALTCHOICE

## 2020-02-20 ENCOUNTER — MED REC SCAN ONLY (OUTPATIENT)
Dept: FAMILY MEDICINE CLINIC | Facility: CLINIC | Age: 65
End: 2020-02-20

## 2020-03-19 ENCOUNTER — MED REC SCAN ONLY (OUTPATIENT)
Dept: FAMILY MEDICINE CLINIC | Facility: CLINIC | Age: 65
End: 2020-03-19

## 2020-05-01 RX ORDER — LISINOPRIL AND HYDROCHLOROTHIAZIDE 20; 12.5 MG/1; MG/1
TABLET ORAL
Qty: 90 TABLET | Refills: 0 | Status: SHIPPED | OUTPATIENT
Start: 2020-05-01 | End: 2020-07-30

## 2020-05-01 NOTE — TELEPHONE ENCOUNTER
Hypertension Medications Protocol Passed4/30 11:42 PM   CMP or BMP in past 12 months    Last serum creatinine< 2.0    Appointment in past 6 or next 3 months       LISINOPRIL-HYDROCHLOROTHIAZIDE 20-12.5 MG Oral Tab      Last refilled:11/4/19 - 90 tabs - 1 r

## 2020-07-07 ENCOUNTER — MED REC SCAN ONLY (OUTPATIENT)
Dept: FAMILY MEDICINE CLINIC | Facility: CLINIC | Age: 65
End: 2020-07-07

## 2020-07-29 ENCOUNTER — TELEPHONE (OUTPATIENT)
Dept: SURGERY | Facility: CLINIC | Age: 65
End: 2020-07-29

## 2020-07-29 ENCOUNTER — OFFICE VISIT (OUTPATIENT)
Dept: SURGERY | Facility: CLINIC | Age: 65
End: 2020-07-29
Payer: MEDICARE

## 2020-07-29 VITALS
TEMPERATURE: 99 F | SYSTOLIC BLOOD PRESSURE: 155 MMHG | HEART RATE: 68 BPM | DIASTOLIC BLOOD PRESSURE: 84 MMHG | WEIGHT: 211 LBS | BODY MASS INDEX: 35 KG/M2

## 2020-07-29 DIAGNOSIS — Z90.49 HISTORY OF COLON RESECTION: ICD-10-CM

## 2020-07-29 DIAGNOSIS — K57.92 DIVERTICULITIS: Primary | ICD-10-CM

## 2020-07-29 DIAGNOSIS — R10.32 LLQ ABDOMINAL PAIN: ICD-10-CM

## 2020-07-29 PROCEDURE — 99214 OFFICE O/P EST MOD 30 MIN: CPT | Performed by: SURGERY

## 2020-07-29 NOTE — PROGRESS NOTES
Follow Up Visit Note       Active Problems      No diagnosis found. Chief Complaint   Patient presents with:  Abdominal Pain: patient here due to hx of diverticulitis and abdominal pain ( pain scale 4-5) for 6 days. Dull pain on lower left.  When bladd well the following day. He thinks his symptoms may be related to dehydration. He claims constipation and has been taking Dulcolax. Allergies  Tanja Cardenas has No Known Allergies.     Past Medical / Surgical / Social / Family History    The past medical and been reviewed by me today.     Family History   Problem Relation Age of Onset   • Hypertension Father    • Cancer Mother         Breast/CA breast   • Heart Disorder Mother    • Breast Cancer Mother    • Diabetes Mother    • Other (Colon cancer) Mother    • and leg swelling. Gastrointestinal: Negative for abdominal distention, abdominal pain, anal bleeding, blood in stool, constipation, diarrhea, nausea and vomiting. Genitourinary: Negative for difficulty urinating, dysuria, frequency and urgency.    [facility] the defined types were placed in this encounter. Imaging & Referrals   None    Follow Up  No follow-ups on file.     Valentín Longoria DO

## 2020-07-29 NOTE — TELEPHONE ENCOUNTER
Having abdominal pain that is dull close to bladder, doing ducolax. Denies fever or chills. Voiding fine. Denies nausea or vomiting. 2014 had colon resection for diverticulitis. Appt made for today.

## 2020-07-30 RX ORDER — LISINOPRIL AND HYDROCHLOROTHIAZIDE 20; 12.5 MG/1; MG/1
TABLET ORAL
Qty: 90 TABLET | Refills: 0 | Status: SHIPPED | OUTPATIENT
Start: 2020-07-30 | End: 2020-10-28

## 2020-08-03 ENCOUNTER — TELEPHONE (OUTPATIENT)
Dept: FAMILY MEDICINE CLINIC | Facility: CLINIC | Age: 65
End: 2020-08-03

## 2020-08-03 DIAGNOSIS — R91.1 PULMONARY NODULE: Primary | ICD-10-CM

## 2020-08-03 NOTE — TELEPHONE ENCOUNTER
Yes he does. Order placed to be done at an [facility] facility. Ideally this is done at the same facility he had CT heart score.   Thanks

## 2020-08-03 NOTE — TELEPHONE ENCOUNTER
Forward to Dr. Lorena Jackson: please advise on the following pt reminder. Does pt still need to complete?      Elton Harper, RN  [facility] Nurse             CT of Chest in 1 year - check stability of lung nodule

## 2020-08-04 ENCOUNTER — MED REC SCAN ONLY (OUTPATIENT)
Dept: SURGERY | Facility: CLINIC | Age: 65
End: 2020-08-04

## 2020-08-04 NOTE — TELEPHONE ENCOUNTER
Patient has an appointment on 8 5 2020 for the CT. CT ordered by Dr. Leana Alexander was closed.  Dr. Марина Murcia ordered CT that has been approved

## 2020-08-05 ENCOUNTER — HOSPITAL ENCOUNTER (OUTPATIENT)
Dept: CT IMAGING | Age: 65
Discharge: HOME | End: 2020-08-05
Attending: FAMILY MEDICINE
Payer: MEDICARE

## 2020-08-05 ENCOUNTER — HOSPITAL ENCOUNTER (OUTPATIENT)
Dept: CT IMAGING | Age: 65
Discharge: HOME | End: 2020-08-05
Attending: SURGERY
Payer: MEDICARE

## 2020-08-05 DIAGNOSIS — R10.32 LLQ ABDOMINAL PAIN: ICD-10-CM

## 2020-08-05 DIAGNOSIS — Z90.49 HISTORY OF COLON RESECTION: ICD-10-CM

## 2020-08-05 DIAGNOSIS — R91.1 PULMONARY NODULE: ICD-10-CM

## 2020-08-05 DIAGNOSIS — K57.92 DIVERTICULITIS: ICD-10-CM

## 2020-08-05 LAB — CREAT BLD-MCNC: 0.9 MG/DL (ref 0.7–1.3)

## 2020-08-05 PROCEDURE — 71250 CT THORAX DX C-: CPT | Performed by: FAMILY MEDICINE

## 2020-08-05 PROCEDURE — 82565 ASSAY OF CREATININE: CPT

## 2020-08-05 PROCEDURE — 74177 CT ABD & PELVIS W/CONTRAST: CPT | Performed by: SURGERY

## 2020-08-17 ENCOUNTER — OFFICE VISIT (OUTPATIENT)
Dept: SURGERY | Facility: CLINIC | Age: 65
End: 2020-08-17
Payer: MEDICARE

## 2020-08-17 VITALS
RESPIRATION RATE: 16 BRPM | WEIGHT: 210.63 LBS | OXYGEN SATURATION: 97 % | SYSTOLIC BLOOD PRESSURE: 150 MMHG | DIASTOLIC BLOOD PRESSURE: 77 MMHG | BODY MASS INDEX: 35 KG/M2 | HEART RATE: 69 BPM

## 2020-08-17 DIAGNOSIS — R19.00 RETROPERITONEAL MASS: Primary | ICD-10-CM

## 2020-08-17 PROBLEM — D47.2 MGUS (MONOCLONAL GAMMOPATHY OF UNKNOWN SIGNIFICANCE): Status: ACTIVE | Noted: 2020-08-17

## 2020-08-17 PROBLEM — E04.1 LEFT THYROID NODULE: Status: ACTIVE | Noted: 2020-08-17

## 2020-08-17 PROCEDURE — 99205 OFFICE O/P NEW HI 60 MIN: CPT | Performed by: SURGERY

## 2020-08-17 RX ORDER — ACETAMINOPHEN, ASPIRIN AND CAFFEINE 250; 250; 65 MG/1; MG/1; MG/1
1 TABLET, FILM COATED ORAL EVERY 6 HOURS PRN
COMMUNITY

## 2020-08-17 NOTE — CONSULTS
[facility]        Patient Name:  Chauncey Rodgers   YOB: 1955   Gender:  Male   Appt Date:  8/17/2020   Provider:  Janelle Carroll MD     PATIENT PROVIDERS    Primary Care Cristofer Chappell MD   Address: [address] [place] [address] Allergies Reviewed:  No Known Allergies     History:  Reviewed:  Past Medical History:   Diagnosis Date   • Abnormal Heart Score CT 08/2019    281.3   • Adenomatous colon polyp    • Anesthesia complication     woke up during propofol   • Arthritis of facet Years since quittin.9      Smokeless tobacco: Never Used      Tobacco comment: social    Alcohol use:  Yes      Alcohol/week: 14.0 standard drinks      Types: 14 Cans of beer per week      Frequency: 4 or more times a week      Comment: 1 beer wit There is interval increase in size of a predominantly fat attenuation mass in the medial border lower pole left kidney that displaces the accessory left renal artery to the lower pole in the left ureteral pelvic junction.   Mass measures 5 x 3.8 cm in maxim

## 2020-10-28 RX ORDER — LISINOPRIL AND HYDROCHLOROTHIAZIDE 20; 12.5 MG/1; MG/1
TABLET ORAL
Qty: 90 TABLET | Refills: 0 | Status: SHIPPED | OUTPATIENT
Start: 2020-10-28 | End: 2021-01-26

## 2020-12-15 ENCOUNTER — MED REC SCAN ONLY (OUTPATIENT)
Dept: FAMILY MEDICINE CLINIC | Facility: CLINIC | Age: 65
End: 2020-12-15

## 2020-12-18 ENCOUNTER — OFFICE VISIT (OUTPATIENT)
Dept: FAMILY MEDICINE CLINIC | Facility: CLINIC | Age: 65
End: 2020-12-18
Payer: MEDICARE

## 2020-12-18 VITALS
WEIGHT: 213 LBS | DIASTOLIC BLOOD PRESSURE: 70 MMHG | BODY MASS INDEX: 35 KG/M2 | TEMPERATURE: 97 F | SYSTOLIC BLOOD PRESSURE: 130 MMHG

## 2020-12-18 DIAGNOSIS — M54.6 RIGHT-SIDED THORACIC BACK PAIN, UNSPECIFIED CHRONICITY: Primary | ICD-10-CM

## 2020-12-18 DIAGNOSIS — M79.604 LUMBAR PAIN WITH RADIATION DOWN RIGHT LEG: ICD-10-CM

## 2020-12-18 DIAGNOSIS — M47.814 THORACIC ARTHRITIS: ICD-10-CM

## 2020-12-18 DIAGNOSIS — M54.50 LUMBAR PAIN WITH RADIATION DOWN RIGHT LEG: ICD-10-CM

## 2020-12-18 DIAGNOSIS — M47.816 ARTHRITIS OF LUMBAR SPINE: ICD-10-CM

## 2020-12-18 PROCEDURE — 99214 OFFICE O/P EST MOD 30 MIN: CPT | Performed by: FAMILY MEDICINE

## 2020-12-18 PROCEDURE — 90732 PPSV23 VACC 2 YRS+ SUBQ/IM: CPT | Performed by: FAMILY MEDICINE

## 2020-12-18 PROCEDURE — G0008 ADMIN INFLUENZA VIRUS VAC: HCPCS | Performed by: FAMILY MEDICINE

## 2020-12-18 PROCEDURE — 90686 IIV4 VACC NO PRSV 0.5 ML IM: CPT | Performed by: FAMILY MEDICINE

## 2020-12-18 PROCEDURE — G0009 ADMIN PNEUMOCOCCAL VACCINE: HCPCS | Performed by: FAMILY MEDICINE

## 2020-12-18 NOTE — PROGRESS NOTES
Adrianne Alexandre is a 60year old male.     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 Thoracic arthritis  Await results   - MRI THORACIC+LUMBAR SPINE  (CPT=72146/29718); Future    4.  Arthritis of lumbar spine  Await result

## 2020-12-23 ENCOUNTER — TELEPHONE (OUTPATIENT)
Dept: FAMILY MEDICINE CLINIC | Facility: CLINIC | Age: 65
End: 2020-12-23

## 2020-12-23 NOTE — TELEPHONE ENCOUNTER
Please let patient or caregiver know or leave message that the MRIs from [address] done 12/22/2020 show that the thoracic spine is fine. The Lumbar spine has multilevel arthritic changes that cause moderate to severe narrowing of the nerve exit holes.    Next

## 2021-01-05 ENCOUNTER — OFFICE VISIT (OUTPATIENT)
Dept: SURGERY | Facility: CLINIC | Age: 66
End: 2021-01-05
Payer: MEDICARE

## 2021-01-05 VITALS — HEART RATE: 64 BPM | HEIGHT: 66.5 IN | WEIGHT: 213 LBS | BODY MASS INDEX: 33.83 KG/M2

## 2021-01-05 DIAGNOSIS — K80.50 BILIARY COLIC: Primary | ICD-10-CM

## 2021-01-05 PROCEDURE — 99214 OFFICE O/P EST MOD 30 MIN: CPT | Performed by: SURGERY

## 2021-01-05 NOTE — H&P
Chauncey Rodgers is a 60year old male  Patient presents with:  Abdominal Pain: est patient- c/o sharp abd pain that radiates to back and hip. pt states after he eats something fatty or spicy he has pain. imaging done at [address] [place] [address] Jaspreet Vaughn DO at [facility] MAIN OR   • COLONOSCOPY, POSSIBLE BIOPSY, POSSIBLE POLYPECTOMY 37925 N/A 1/16/2019    Performed by Jaspreet Vaughn DO at [address] [place] [address]   • [facility] POLYPECTOMY N/A 3/9/2016    Performed by Jaspreet Vaughn fever or chills  SKIN: denies any unusual skin rashes or jaundice  HEENT: denies nasal congestion, sinus pain or sore throat; hearing loss negative,   EYES , no diplopia or vision changes  RESPIRATORY: denies shortness of breath, wheezing or cough   CARDIO the common bile duct. Patient has slightly increased risk for open cholecystectomy due to his prior history of abdominal surgery. Additionally will attempt to look at the ascending colon and right lower quadrant region.   He has no evidence of hernia on e

## 2021-01-06 ENCOUNTER — TELEPHONE (OUTPATIENT)
Dept: SURGERY | Facility: CLINIC | Age: 66
End: 2021-01-06

## 2021-01-06 DIAGNOSIS — K80.18 CALCULUS OF GALLBLADDER WITH OTHER CHOLECYSTITIS WITHOUT OBSTRUCTION: Primary | ICD-10-CM

## 2021-01-06 RX ORDER — ATORVASTATIN CALCIUM 20 MG/1
TABLET, FILM COATED ORAL
Qty: 90 TABLET | Refills: 0 | Status: SHIPPED | OUTPATIENT
Start: 2021-01-06 | End: 2021-04-06

## 2021-01-06 NOTE — TELEPHONE ENCOUNTER
Cholesterol Medication Protocol Hovgkt5101/05/2021 11:53 PM   ALT < 80 Protocol Details    ALT resulted within past year     Lipid panel within past 12 months     Appointment within past 12 or next 3 months      Refilled per protocol.

## 2021-01-08 RX ORDER — ACETAMINOPHEN 500 MG
1000 TABLET ORAL ONCE
Status: CANCELLED | OUTPATIENT
Start: 2021-01-08 | End: 2021-01-08

## 2021-01-08 RX ORDER — ASCORBIC ACID 500 MG
500 TABLET ORAL DAILY
COMMUNITY

## 2021-01-09 ENCOUNTER — EKG ENCOUNTER (OUTPATIENT)
Dept: LAB | Age: 66
End: 2021-01-09
Attending: SURGERY
Payer: MEDICARE

## 2021-01-09 ENCOUNTER — LABORATORY ENCOUNTER (OUTPATIENT)
Dept: LAB | Age: 66
End: 2021-01-09
Attending: SURGERY
Payer: MEDICARE

## 2021-01-09 DIAGNOSIS — Z90.49 HISTORY OF CHOLECYSTECTOMY: ICD-10-CM

## 2021-01-09 DIAGNOSIS — I10 ESSENTIAL HYPERTENSION: ICD-10-CM

## 2021-01-09 LAB
ALBUMIN SERPL-MCNC: 4.2 G/DL (ref 3.4–5)
ALBUMIN/GLOB SERPL: 1.3 {RATIO} (ref 1–2)
ALP LIVER SERPL-CCNC: 68 U/L
ALT SERPL-CCNC: 24 U/L
ANION GAP SERPL CALC-SCNC: 3 MMOL/L (ref 0–18)
AST SERPL-CCNC: 12 U/L (ref 15–37)
ATRIAL RATE: 55 BPM
BILIRUB SERPL-MCNC: 0.5 MG/DL (ref 0.1–2)
BUN BLD-MCNC: 17 MG/DL (ref 7–18)
BUN/CREAT SERPL: 15 (ref 10–20)
CALCIUM BLD-MCNC: 9.5 MG/DL (ref 8.5–10.1)
CHLORIDE SERPL-SCNC: 105 MMOL/L (ref 98–112)
CO2 SERPL-SCNC: 27 MMOL/L (ref 21–32)
CREAT BLD-MCNC: 1.13 MG/DL
GLOBULIN PLAS-MCNC: 3.3 G/DL (ref 2.8–4.4)
GLUCOSE BLD-MCNC: 83 MG/DL (ref 70–99)
M PROTEIN MFR SERPL ELPH: 7.5 G/DL (ref 6.4–8.2)
OSMOLALITY SERPL CALC.SUM OF ELEC: 281 MOSM/KG (ref 275–295)
P AXIS: 68 DEGREES
P-R INTERVAL: 138 MS
PATIENT FASTING Y/N/NP: YES
POTASSIUM SERPL-SCNC: 4.8 MMOL/L (ref 3.5–5.1)
Q-T INTERVAL: 442 MS
QRS DURATION: 100 MS
QTC CALCULATION (BEZET): 422 MS
R AXIS: 56 DEGREES
SODIUM SERPL-SCNC: 135 MMOL/L (ref 136–145)
T AXIS: 59 DEGREES
VENTRICULAR RATE: 55 BPM

## 2021-01-09 PROCEDURE — 93010 ELECTROCARDIOGRAM REPORT: CPT | Performed by: INTERNAL MEDICINE

## 2021-01-09 PROCEDURE — 80053 COMPREHEN METABOLIC PANEL: CPT

## 2021-01-09 PROCEDURE — 36415 COLL VENOUS BLD VENIPUNCTURE: CPT

## 2021-01-09 PROCEDURE — 93005 ELECTROCARDIOGRAM TRACING: CPT

## 2021-01-19 ENCOUNTER — LAB ENCOUNTER (OUTPATIENT)
Dept: LAB | Age: 66
End: 2021-01-19
Attending: SURGERY
Payer: MEDICARE

## 2021-01-19 DIAGNOSIS — Z90.49 HISTORY OF CHOLECYSTECTOMY: ICD-10-CM

## 2021-01-19 NOTE — PROGRESS NOTES
Called patient to inform him labs are okay per Dr. Shirley Azevedo result note. Pt verbalized understanding. No further questions at this time.

## 2021-01-20 LAB — SARS-COV-2 RNA RESP QL NAA+PROBE: NOT DETECTED

## 2021-01-22 ENCOUNTER — HOSPITAL ENCOUNTER (OUTPATIENT)
Facility: HOSPITAL | Age: 66
Setting detail: HOSPITAL OUTPATIENT SURGERY
Discharge: HOME | End: 2021-01-22
Attending: SURGERY | Admitting: SURGERY
Payer: MEDICARE

## 2021-01-22 ENCOUNTER — ANESTHESIA EVENT (OUTPATIENT)
Dept: SURGERY | Facility: HOSPITAL | Age: 66
End: 2021-01-22
Payer: MEDICARE

## 2021-01-22 ENCOUNTER — APPOINTMENT (OUTPATIENT)
Dept: GENERAL RADIOLOGY | Facility: HOSPITAL | Age: 66
End: 2021-01-22
Attending: SURGERY
Payer: MEDICARE

## 2021-01-22 ENCOUNTER — ANESTHESIA (OUTPATIENT)
Dept: SURGERY | Facility: HOSPITAL | Age: 66
End: 2021-01-22
Payer: MEDICARE

## 2021-01-22 VITALS
WEIGHT: 207.31 LBS | DIASTOLIC BLOOD PRESSURE: 77 MMHG | RESPIRATION RATE: 16 BRPM | SYSTOLIC BLOOD PRESSURE: 147 MMHG | BODY MASS INDEX: 32.92 KG/M2 | HEIGHT: 66.5 IN | HEART RATE: 81 BPM | TEMPERATURE: 98 F | OXYGEN SATURATION: 100 %

## 2021-01-22 DIAGNOSIS — Z90.49 HISTORY OF CHOLECYSTECTOMY: Primary | ICD-10-CM

## 2021-01-22 DIAGNOSIS — I10 ESSENTIAL HYPERTENSION: ICD-10-CM

## 2021-01-22 DIAGNOSIS — K80.18 CALCULUS OF GALLBLADDER WITH OTHER CHOLECYSTITIS WITHOUT OBSTRUCTION: ICD-10-CM

## 2021-01-22 PROCEDURE — 88304 TISSUE EXAM BY PATHOLOGIST: CPT | Performed by: SURGERY

## 2021-01-22 PROCEDURE — 0FT44ZZ RESECTION OF GALLBLADDER, PERCUTANEOUS ENDOSCOPIC APPROACH: ICD-10-PCS | Performed by: SURGERY

## 2021-01-22 PROCEDURE — 74300 X-RAY BILE DUCTS/PANCREAS: CPT | Performed by: SURGERY

## 2021-01-22 PROCEDURE — BF100ZZ FLUOROSCOPY OF BILE DUCTS USING HIGH OSMOLAR CONTRAST: ICD-10-PCS | Performed by: SURGERY

## 2021-01-22 RX ORDER — HYDROCODONE BITARTRATE AND ACETAMINOPHEN 5; 325 MG/1; MG/1
1 TABLET ORAL EVERY 6 HOURS PRN
Qty: 20 TABLET | Refills: 0 | Status: SHIPPED | OUTPATIENT
Start: 2021-01-22 | End: 2021-02-16

## 2021-01-22 RX ORDER — EPHEDRINE SULFATE 50 MG/ML
INJECTION INTRAVENOUS AS NEEDED
Status: DISCONTINUED | OUTPATIENT
Start: 2021-01-22 | End: 2021-01-22 | Stop reason: SURG

## 2021-01-22 RX ORDER — GLYCOPYRROLATE 0.2 MG/ML
INJECTION, SOLUTION INTRAMUSCULAR; INTRAVENOUS AS NEEDED
Status: DISCONTINUED | OUTPATIENT
Start: 2021-01-22 | End: 2021-01-22 | Stop reason: SURG

## 2021-01-22 RX ORDER — ACETAMINOPHEN 500 MG
1000 TABLET ORAL EVERY 6 HOURS PRN
Status: ON HOLD | COMMUNITY
End: 2021-01-22

## 2021-01-22 RX ORDER — LIDOCAINE HYDROCHLORIDE 10 MG/ML
INJECTION, SOLUTION EPIDURAL; INFILTRATION; INTRACAUDAL; PERINEURAL AS NEEDED
Status: DISCONTINUED | OUTPATIENT
Start: 2021-01-22 | End: 2021-01-22 | Stop reason: SURG

## 2021-01-22 RX ORDER — DEXAMETHASONE SODIUM PHOSPHATE 4 MG/ML
VIAL (ML) INJECTION AS NEEDED
Status: DISCONTINUED | OUTPATIENT
Start: 2021-01-22 | End: 2021-01-22 | Stop reason: SURG

## 2021-01-22 RX ORDER — NEOSTIGMINE METHYLSULFATE 1 MG/ML
INJECTION INTRAVENOUS AS NEEDED
Status: DISCONTINUED | OUTPATIENT
Start: 2021-01-22 | End: 2021-01-22 | Stop reason: SURG

## 2021-01-22 RX ORDER — MEPERIDINE HYDROCHLORIDE 25 MG/ML
12.5 INJECTION INTRAMUSCULAR; INTRAVENOUS; SUBCUTANEOUS AS NEEDED
Status: DISCONTINUED | OUTPATIENT
Start: 2021-01-22 | End: 2021-01-22

## 2021-01-22 RX ORDER — NALOXONE HYDROCHLORIDE 0.4 MG/ML
80 INJECTION, SOLUTION INTRAMUSCULAR; INTRAVENOUS; SUBCUTANEOUS AS NEEDED
Status: DISCONTINUED | OUTPATIENT
Start: 2021-01-22 | End: 2021-01-22

## 2021-01-22 RX ORDER — MIDAZOLAM HYDROCHLORIDE 1 MG/ML
1 INJECTION INTRAMUSCULAR; INTRAVENOUS EVERY 5 MIN PRN
Status: DISCONTINUED | OUTPATIENT
Start: 2021-01-22 | End: 2021-01-22

## 2021-01-22 RX ORDER — HYDROCODONE BITARTRATE AND ACETAMINOPHEN 10; 325 MG/1; MG/1
1 TABLET ORAL AS NEEDED
Status: COMPLETED | OUTPATIENT
Start: 2021-01-22 | End: 2021-01-22

## 2021-01-22 RX ORDER — HYDROCODONE BITARTRATE AND ACETAMINOPHEN 10; 325 MG/1; MG/1
2 TABLET ORAL AS NEEDED
Status: COMPLETED | OUTPATIENT
Start: 2021-01-22 | End: 2021-01-22

## 2021-01-22 RX ORDER — BUPIVACAINE HYDROCHLORIDE 5 MG/ML
INJECTION, SOLUTION EPIDURAL; INTRACAUDAL AS NEEDED
Status: DISCONTINUED | OUTPATIENT
Start: 2021-01-22 | End: 2021-01-22 | Stop reason: HOSPADM

## 2021-01-22 RX ORDER — PHENYLEPHRINE HCL 10 MG/ML
VIAL (ML) INJECTION AS NEEDED
Status: DISCONTINUED | OUTPATIENT
Start: 2021-01-22 | End: 2021-01-22 | Stop reason: SURG

## 2021-01-22 RX ORDER — HYDROMORPHONE HYDROCHLORIDE 1 MG/ML
0.4 INJECTION, SOLUTION INTRAMUSCULAR; INTRAVENOUS; SUBCUTANEOUS EVERY 5 MIN PRN
Status: DISCONTINUED | OUTPATIENT
Start: 2021-01-22 | End: 2021-01-22

## 2021-01-22 RX ORDER — SODIUM CHLORIDE, SODIUM LACTATE, POTASSIUM CHLORIDE, CALCIUM CHLORIDE 600; 310; 30; 20 MG/100ML; MG/100ML; MG/100ML; MG/100ML
INJECTION, SOLUTION INTRAVENOUS CONTINUOUS
Status: DISCONTINUED | OUTPATIENT
Start: 2021-01-22 | End: 2021-01-22

## 2021-01-22 RX ORDER — ONDANSETRON 2 MG/ML
INJECTION INTRAMUSCULAR; INTRAVENOUS AS NEEDED
Status: DISCONTINUED | OUTPATIENT
Start: 2021-01-22 | End: 2021-01-22 | Stop reason: SURG

## 2021-01-22 RX ORDER — METOCLOPRAMIDE HYDROCHLORIDE 5 MG/ML
10 INJECTION INTRAMUSCULAR; INTRAVENOUS AS NEEDED
Status: DISCONTINUED | OUTPATIENT
Start: 2021-01-22 | End: 2021-01-22

## 2021-01-22 RX ORDER — ROCURONIUM BROMIDE 10 MG/ML
INJECTION, SOLUTION INTRAVENOUS AS NEEDED
Status: DISCONTINUED | OUTPATIENT
Start: 2021-01-22 | End: 2021-01-22 | Stop reason: SURG

## 2021-01-22 RX ORDER — ONDANSETRON 2 MG/ML
4 INJECTION INTRAMUSCULAR; INTRAVENOUS AS NEEDED
Status: DISCONTINUED | OUTPATIENT
Start: 2021-01-22 | End: 2021-01-22

## 2021-01-22 RX ORDER — HEPARIN SODIUM 5000 [USP'U]/ML
5000 INJECTION, SOLUTION INTRAVENOUS; SUBCUTANEOUS ONCE
Status: COMPLETED | OUTPATIENT
Start: 2021-01-22 | End: 2021-01-22

## 2021-01-22 RX ADMIN — SODIUM CHLORIDE, SODIUM LACTATE, POTASSIUM CHLORIDE, CALCIUM CHLORIDE: 600; 310; 30; 20 INJECTION, SOLUTION INTRAVENOUS at 12:18:00

## 2021-01-22 RX ADMIN — NEOSTIGMINE METHYLSULFATE 4 MG: 1 INJECTION INTRAVENOUS at 12:01:00

## 2021-01-22 RX ADMIN — PHENYLEPHRINE HCL 100 MCG: 10 MG/ML VIAL (ML) INJECTION at 11:35:00

## 2021-01-22 RX ADMIN — ROCURONIUM BROMIDE 10 MG: 10 INJECTION, SOLUTION INTRAVENOUS at 11:23:00

## 2021-01-22 RX ADMIN — ROCURONIUM BROMIDE 50 MG: 10 INJECTION, SOLUTION INTRAVENOUS at 11:03:00

## 2021-01-22 RX ADMIN — EPHEDRINE SULFATE 10 MG: 50 INJECTION INTRAVENOUS at 11:31:00

## 2021-01-22 RX ADMIN — EPHEDRINE SULFATE 10 MG: 50 INJECTION INTRAVENOUS at 11:32:00

## 2021-01-22 RX ADMIN — ONDANSETRON 4 MG: 2 INJECTION INTRAMUSCULAR; INTRAVENOUS at 11:39:00

## 2021-01-22 RX ADMIN — DEXAMETHASONE SODIUM PHOSPHATE 8 MG: 4 MG/ML VIAL (ML) INJECTION at 11:24:00

## 2021-01-22 RX ADMIN — GLYCOPYRROLATE 0.8 MG: 0.2 INJECTION, SOLUTION INTRAMUSCULAR; INTRAVENOUS at 12:01:00

## 2021-01-22 RX ADMIN — LIDOCAINE HYDROCHLORIDE 50 MG: 10 INJECTION, SOLUTION EPIDURAL; INFILTRATION; INTRACAUDAL; PERINEURAL at 11:03:00

## 2021-01-22 NOTE — ANESTHESIA PREPROCEDURE EVALUATION
PRE-OP EVALUATION    Patient Name: Hermila Velasco    Pre-op Diagnosis: Calculus of gallbladder with other cholecystitis without obstruction [K80.18]    Procedure(s):  LAPAROSCOPIC CHOLECYSTECTOMY WITH CHOLANGIOGRAM    Surgeon(s) and Role:     * Mas Levon Macias DO at [facility] MAIN OR   • COLONOSCOPY, POSSIBLE BIOPSY, POSSIBLE POLYPECTOMY 08122 N/A 1/16/2019    Performed by Levon Macias DO at [address] [facility]   • [facility] POLYPECTOMY N/A 3/9/2016    Performed by Levon Macias

## 2021-01-22 NOTE — OPERATIVE REPORT
[facility]  Operative Note     Nacho Ureña Location: OR   [facility] [id number] MRN [id number]   Admission Date 1/22/2021 Operation Date 1/22/2021   Attending Physician Praveen Arana DO Operating Physician Sandy Currie DO      Preoperative Diagn single clip was placed on the proximal cystic duct a small opening made in the cystic duct. C-arm cholangiography was carried out using full-strength Hypaque solution. Initially a guidewire was passed into the cystic duct followed by a ureteral catheter. fashion. 20 cc of 0.25% Marcaine plain was injected into all incisions at the completion of the procedure. Sterile dressings were applied.   The patient was transported from the operative suite to recovery room in stable condition all sponge needle counts

## 2021-01-22 NOTE — H&P
Patient presents with a multitude of complaints of abdominal pain. Patient states he has been having right lower quadrant abdominal pain for the past 2 months.   He also claims right upper quadrant abdominal pain which radiates subcostally to his back this POLYPECTOMY N/A 3/9/2016     Performed by Jaspreet Vaughn DO at [address] [place] 11/10/2014     Performed by Jaspreet Vaughn DO at [facility] MAIN OR   • IR EPIDURAL STERIOD INJECTION         neck   • OTHER SURGICAL HISTORY     no diplopia or vision changes  RESPIRATORY: denies shortness of breath, wheezing or cough   CARDIOVASCULAR: denies chest pain or HUNTLEY; no palpitations   GI: denies nausea, vomiting, constipation, diarrhea; no rectal bleeding; no heartburn  GENITAL/: no dy will attempt to look at the ascending colon and right lower quadrant region.   He has no evidence of hernia on examination however he does complain of right sided abdominal pain discussed with him that adhesions may prevent this visualization.

## 2021-01-22 NOTE — ANESTHESIA PROCEDURE NOTES
Airway  Date/Time: 1/22/2021 11:05 AM  Urgency: elective    Difficult airway    General Information and Staff    Patient location during procedure: OR  Anesthesiologist: Matthew Rucker MD  Performed: anesthesiologist     Indications and Patient Conditi

## 2021-01-22 NOTE — ANESTHESIA POSTPROCEDURE EVALUATION
[address] Patient Status:  Hospital Outpatient Surgery   Age/Gender 60year old male MRN [id number]   [facility] SURGERY Attending Levon Macias, [facility] Day # 0 PCP Vanita Mckinney MD       Anesthesia Post-op

## 2021-01-26 RX ORDER — LISINOPRIL AND HYDROCHLOROTHIAZIDE 20; 12.5 MG/1; MG/1
TABLET ORAL
Qty: 90 TABLET | Refills: 0 | Status: SHIPPED | OUTPATIENT
Start: 2021-01-26 | End: 2021-04-26

## 2021-01-29 ENCOUNTER — VIRTUAL PHONE E/M (OUTPATIENT)
Dept: SURGERY | Facility: CLINIC | Age: 66
End: 2021-01-29

## 2021-01-29 DIAGNOSIS — K80.10 CALCULUS OF GALLBLADDER WITH CHRONIC CHOLECYSTITIS WITHOUT OBSTRUCTION: Primary | ICD-10-CM

## 2021-01-29 DIAGNOSIS — Z90.49 S/P LAPAROSCOPIC CHOLECYSTECTOMY: ICD-10-CM

## 2021-01-29 PROCEDURE — 99024 POSTOP FOLLOW-UP VISIT: CPT | Performed by: PHYSICIAN ASSISTANT

## 2021-01-29 NOTE — PROGRESS NOTES
Virtual Telephone Check-In    Laly Hood verbally consents to a Virtual/Telephone Check-In visit on 01/29/21. Patient has been referred to the [address] [facility] website at [email or web address] to review the yearly Consent to Treat document.     Patient with any lifting, twisting, bending, pushing, or pulling. He is to avoid any strenuous core exercises. He is to avoid shoveling snow or using a snowblower. I discussed with the patient that there is no indication for additional follow-up at this time.

## 2021-02-11 ENCOUNTER — TELEPHONE (OUTPATIENT)
Dept: SURGERY | Facility: CLINIC | Age: 66
End: 2021-02-11

## 2021-02-11 ENCOUNTER — OFFICE VISIT (OUTPATIENT)
Dept: SURGERY | Facility: CLINIC | Age: 66
End: 2021-02-11
Payer: MEDICARE

## 2021-02-11 VITALS
BODY MASS INDEX: 32.56 KG/M2 | TEMPERATURE: 98 F | WEIGHT: 205 LBS | DIASTOLIC BLOOD PRESSURE: 78 MMHG | HEIGHT: 66.5 IN | SYSTOLIC BLOOD PRESSURE: 148 MMHG | HEART RATE: 64 BPM

## 2021-02-11 DIAGNOSIS — K62.5 RECTAL BLEEDING: Primary | ICD-10-CM

## 2021-02-11 PROCEDURE — 99214 OFFICE O/P EST MOD 30 MIN: CPT | Performed by: SURGERY

## 2021-02-11 PROCEDURE — 46600 DIAGNOSTIC ANOSCOPY SPX: CPT | Performed by: SURGERY

## 2021-02-11 NOTE — H&P
New Patient Visit Note       Active Problems      1. Rectal bleeding        Chief Complaint   Patient presents with:  Rectal Bleeding: pt c/o of rectal bleeding, rectum pressure this morning with bm x 4 days      Allergies  Luiz jeffrey has No Known Allergies. Performed by Rick Mahajan DO at [facility]   • IR EPIDURAL STERIOD INJECTION      neck   • LAPAROSCOPIC CHOLECYSTECTOMY N/A 1/22/2021    Performed by Rick Mahajan DO at [facility] MAIN OR   • OTHER SURGICAL HISTORY      prostatectomy   • [address] shortness of breath and wheezing. Cardiovascular: Negative for chest pain, palpitations and leg swelling. Gastrointestinal: Positive for anal bleeding and rectal pain.  Negative for abdominal distention, abdominal pain, blood in stool, constipation, di several months he has had more difficulty with bowel movements. He reports rectal pain and pressure with bowel movements. He states that he has been straining with bowel movements. He was recommended by Dr Scar Patel to add milk of magnesia.   He states that

## 2021-02-11 NOTE — TELEPHONE ENCOUNTER
Patient called office stating had gallbladder surgery with Dr. Jane Huerta on 1/22/21 and for the past 3-4 days has been having rectal bleeding more or about the same amount as a nose bleed.  Patient also stated was having abdominal cramping and rectal discomfort

## 2021-02-16 ENCOUNTER — OFFICE VISIT (OUTPATIENT)
Dept: SURGERY | Facility: CLINIC | Age: 66
End: 2021-02-16
Payer: MEDICARE

## 2021-02-16 VITALS — BODY MASS INDEX: 32.56 KG/M2 | WEIGHT: 205 LBS | HEIGHT: 66.5 IN | HEART RATE: 78 BPM | TEMPERATURE: 99 F

## 2021-02-16 DIAGNOSIS — K62.5 RECTAL BLEEDING: Primary | ICD-10-CM

## 2021-02-16 PROCEDURE — 99213 OFFICE O/P EST LOW 20 MIN: CPT | Performed by: SURGERY

## 2021-02-16 RX ORDER — SODIUM, POTASSIUM,MAG SULFATES 17.5-3.13G
SOLUTION, RECONSTITUTED, ORAL ORAL
Qty: 1 KIT | Refills: 0 | Status: SHIPPED | OUTPATIENT
Start: 2021-02-16 | End: 2021-03-10

## 2021-02-16 NOTE — H&P
Curt Arciniega is a 60year old male  Patient presents with:  Anal / Rectal Problem: Est patient states rectal bleeding has stopped. hx diverticulitis and states rectal pain and dull abd pain is simiilar to prior to colon resection.  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  1-4 tabs qd prn, Disp: , Rfl:     No current fac intolerance    EXAM     Pulse 78, temperature 98.7 °F (37.1 °C), temperature source Temporal, height 66.5\", weight 205 lb (93 kg). GENERAL: well developed, well nourished male, in no apparent distress.     MENTAL STATUS :Alert, oriented x 3  PSYCH: normal ASSESSMENT   Imp:new onset rectal bleeding in pt with h/o polyps and prostate radiation  PLan:colonoscopy disc with pt risk of bleeding and bowel perforation all qsts answered      Meds & Refills for this Visit:  Requested Prescriptions      No presc

## 2021-02-28 ENCOUNTER — LAB ENCOUNTER (OUTPATIENT)
Dept: LAB | Facility: HOSPITAL | Age: 66
End: 2021-02-28
Attending: INTERNAL MEDICINE
Payer: MEDICARE

## 2021-03-03 PROCEDURE — 88305 TISSUE EXAM BY PATHOLOGIST: CPT | Performed by: SURGERY

## 2021-03-10 ENCOUNTER — MED REC SCAN ONLY (OUTPATIENT)
Dept: SURGERY | Facility: CLINIC | Age: 66
End: 2021-03-10

## 2021-03-10 ENCOUNTER — OFFICE VISIT (OUTPATIENT)
Dept: FAMILY MEDICINE CLINIC | Facility: CLINIC | Age: 66
End: 2021-03-10
Payer: MEDICARE

## 2021-03-10 VITALS
HEART RATE: 68 BPM | SYSTOLIC BLOOD PRESSURE: 120 MMHG | BODY MASS INDEX: 33 KG/M2 | TEMPERATURE: 98 F | RESPIRATION RATE: 16 BRPM | OXYGEN SATURATION: 99 % | DIASTOLIC BLOOD PRESSURE: 82 MMHG | WEIGHT: 208 LBS

## 2021-03-10 DIAGNOSIS — Z00.00 MEDICARE ANNUAL WELLNESS VISIT, SUBSEQUENT: Primary | ICD-10-CM

## 2021-03-10 DIAGNOSIS — M54.6 MIDLINE THORACIC BACK PAIN, UNSPECIFIED CHRONICITY: ICD-10-CM

## 2021-03-10 DIAGNOSIS — M47.812 FACET ARTHRITIS OF CERVICAL REGION: ICD-10-CM

## 2021-03-10 DIAGNOSIS — I10 ESSENTIAL HYPERTENSION: ICD-10-CM

## 2021-03-10 DIAGNOSIS — H93.A1 OBJECTIVE PULSATILE TINNITUS OF RIGHT EAR: ICD-10-CM

## 2021-03-10 DIAGNOSIS — L29.9 PRURITUS: ICD-10-CM

## 2021-03-10 DIAGNOSIS — L98.9 SKIN LESION: ICD-10-CM

## 2021-03-10 DIAGNOSIS — M54.2 CERVICALGIA: ICD-10-CM

## 2021-03-10 DIAGNOSIS — M47.816 ARTHRITIS OF FACET JOINT OF LUMBAR SPINE: ICD-10-CM

## 2021-03-10 DIAGNOSIS — E78.5 HYPERLIPIDEMIA, UNSPECIFIED HYPERLIPIDEMIA TYPE: ICD-10-CM

## 2021-03-10 LAB
CHOLEST SMN-MCNC: 154 MG/DL (ref ?–200)
HDLC SERPL-MCNC: 77 MG/DL (ref 40–59)
LDLC SERPL CALC-MCNC: 68 MG/DL (ref ?–100)
NONHDLC SERPL-MCNC: 77 MG/DL (ref ?–130)
PATIENT FASTING Y/N/NP: YES
T4 FREE SERPL-MCNC: 1 NG/DL (ref 0.8–1.7)
TRIGL SERPL-MCNC: 46 MG/DL (ref 30–149)
TSI SER-ACNC: 1.1 MIU/ML (ref 0.36–3.74)
VLDLC SERPL CALC-MCNC: 9 MG/DL (ref 0–30)

## 2021-03-10 PROCEDURE — 84439 ASSAY OF FREE THYROXINE: CPT | Performed by: FAMILY MEDICINE

## 2021-03-10 PROCEDURE — 84443 ASSAY THYROID STIM HORMONE: CPT | Performed by: FAMILY MEDICINE

## 2021-03-10 PROCEDURE — G0439 PPPS, SUBSEQ VISIT: HCPCS | Performed by: FAMILY MEDICINE

## 2021-03-10 PROCEDURE — 80061 LIPID PANEL: CPT | Performed by: FAMILY MEDICINE

## 2021-03-10 NOTE — PROGRESS NOTES
Pam Oakes is a 60year old male.     CC:  Medicare PX    HPI:  Yearly PX    Last lipid:  Lab Results   Component Value Date    CHOLEST 181 01/23/2020    TRIG 60 01/23/2020    HDL 77 (H) 01/23/2020    LDL 92 01/23/2020    VLDL 12 01/23/2020 the thoracic spine was performed. Findings: Sagittal images demonstrate normal vertebral body and disk space height. There is no evidence of abnormal signal within the marrow. No evidence of fracture or subluxation.  Disc spaces demonstrate normal signa surgery     Arthritis of facet joint of lumbar spine (HCC)--pain tolerable     MGUS (monoclonal gammopathy of unknown significance)--followed by Dr. Goetz Estimdayron, Heme/Onc     Retroperitoneal mass--biopsy x 2, adipose tissue, starting to compress the [facility] all    Feeling down, depressed, or hopeless: Not at all    PHQ-2 SCORE: 0                      Advance Directives     Do you have a healthcare power of ?: No    Do you have a living will?: No     Hearing Assessment (Required for AWV/SWV)      [facility] Diverticulosis of colon (without mention of hemorrhage)    • Facet arthritis of cervical region [facility])    • Hernia, umbilical      s/p repair   • High cholesterol    • History of prostate cancer 04/2016    s/p prostatectomy   • History of SCC (squamous cell Father    • Cancer Mother         Breast/CA breast   • Heart Disorder Mother    • Breast Cancer Mother    • Diabetes Mother    • Other (Colon cancer) Mother    • Breast Cancer Sister    • Cancer Sister         CA breast   • Cancer Paternal Grandmother supraclavicular nodes  MUSCULOSKELETAL: normal ambulation  NEURO: Awake and alert. Normal speech and articulation. No facial droop or asymmetry. Moving all extremities equally. Symmetric B patellar DTRs     ASSESSMENT AND PLAN    1.  Medicare annual wellnes PEGGY CHICAS (CPT=93880)    Meds & Refills for this Visit:  Requested Prescriptions      No prescriptions requested or ordered in this encounter         Total time spent on day of service 75 minutes  Preparing to see patient including chart review and pre stephon

## 2021-03-16 ENCOUNTER — PATIENT OUTREACH (OUTPATIENT)
Dept: SURGERY | Facility: CLINIC | Age: 66
End: 2021-03-16

## 2021-03-18 ENCOUNTER — TELEPHONE (OUTPATIENT)
Dept: FAMILY MEDICINE CLINIC | Facility: CLINIC | Age: 66
End: 2021-03-18

## 2021-03-18 ENCOUNTER — HOSPITAL ENCOUNTER (OUTPATIENT)
Dept: ULTRASOUND IMAGING | Age: 66
Discharge: HOME | End: 2021-03-18
Attending: FAMILY MEDICINE
Payer: MEDICARE

## 2021-03-18 DIAGNOSIS — M54.2 CERVICALGIA: ICD-10-CM

## 2021-03-18 DIAGNOSIS — H93.A1 OBJECTIVE PULSATILE TINNITUS OF RIGHT EAR: ICD-10-CM

## 2021-03-18 PROCEDURE — 93880 EXTRACRANIAL BILAT STUDY: CPT | Performed by: FAMILY MEDICINE

## 2021-03-18 NOTE — TELEPHONE ENCOUNTER
Pt called back regarding carotid artery imaging results.      He would like results faxed to Dr Sergey Roper     [phone]  Fax [phone]    Thank you

## 2021-03-25 ENCOUNTER — HOSPITAL ENCOUNTER (OUTPATIENT)
Dept: ULTRASOUND IMAGING | Age: 66
Discharge: HOME | End: 2021-03-25
Attending: OTOLARYNGOLOGY
Payer: MEDICARE

## 2021-03-25 DIAGNOSIS — E04.1 THYROID NODULE: ICD-10-CM

## 2021-03-25 PROCEDURE — 76536 US EXAM OF HEAD AND NECK: CPT | Performed by: OTOLARYNGOLOGY

## 2021-04-06 RX ORDER — ATORVASTATIN CALCIUM 20 MG/1
TABLET, FILM COATED ORAL
Qty: 90 TABLET | Refills: 3 | Status: SHIPPED | OUTPATIENT
Start: 2021-04-06

## 2021-04-06 NOTE — TELEPHONE ENCOUNTER
Cholesterol Medication Protocol Zadjir1404/05/2021 11:51 PM   ALT < 80 Protocol Details    ALT resulted within past year     Lipid panel within past 12 months     Appointment within past 12 or next 3 months      LOV: 3/10/21  Last Refill:  1/6/21 #90 0 RF

## 2021-04-26 RX ORDER — LISINOPRIL AND HYDROCHLOROTHIAZIDE 20; 12.5 MG/1; MG/1
TABLET ORAL
Qty: 90 TABLET | Refills: 0 | Status: SHIPPED | OUTPATIENT
Start: 2021-04-26 | End: 2021-07-26

## 2021-04-26 NOTE — TELEPHONE ENCOUNTER
Hypertension Medications Protocol Kpxqzv2604/25/2021 11:51 PM   CMP or BMP in past 12 months Protocol Details    Last serum creatinine< 2.0     Appointment in past 6 or next 3 months      Refilled per protocol.

## 2021-07-14 ENCOUNTER — MED REC SCAN ONLY (OUTPATIENT)
Dept: FAMILY MEDICINE CLINIC | Facility: CLINIC | Age: 66
End: 2021-07-14

## 2021-07-26 RX ORDER — LISINOPRIL AND HYDROCHLOROTHIAZIDE 20; 12.5 MG/1; MG/1
TABLET ORAL
Qty: 90 TABLET | Refills: 0 | Status: SHIPPED | OUTPATIENT
Start: 2021-07-26 | End: 2021-10-25

## 2021-07-26 NOTE — TELEPHONE ENCOUNTER
Hypertension Medications Protocol Xmimrd1807/25/2021 05:54 AM   CMP or BMP in past 12 months Protocol Details    Last serum creatinine< 2.0     Appointment in past 6 or next 3 months      Refilled per protocol and sent to pharmacy.

## 2021-09-16 ENCOUNTER — MED REC SCAN ONLY (OUTPATIENT)
Dept: FAMILY MEDICINE CLINIC | Facility: CLINIC | Age: 66
End: 2021-09-16

## 2021-10-12 ENCOUNTER — TELEPHONE (OUTPATIENT)
Dept: FAMILY MEDICINE CLINIC | Facility: CLINIC | Age: 66
End: 2021-10-12

## 2021-10-12 NOTE — TELEPHONE ENCOUNTER
Patient states that he received his 3rd  COVID vaccine on Oct. 3rd. Patient states that he is pain along the sternum. It is painful when he takes a deep breath. He does not know if its related to the vaccine or not.  He did not have any symptoms like th

## 2021-10-12 NOTE — TELEPHONE ENCOUNTER
Difficult to say what he has. Almost sound like he has costochondritis which is irritation where the ribs meet the sternum. He can try Motrin 200 mg, 4 tabs bid with food for the next 2 days to see if the pain improves.  If it does not then see me Friday or

## 2021-10-12 NOTE — TELEPHONE ENCOUNTER
Pt called states has upper chest pain sometimes hard to breath or having when taking a deep breath     Pt would like to come to office     Pt call back # [phone]    Thank you

## 2021-10-25 RX ORDER — LISINOPRIL AND HYDROCHLOROTHIAZIDE 20; 12.5 MG/1; MG/1
TABLET ORAL
Qty: 90 TABLET | Refills: 1 | Status: SHIPPED | OUTPATIENT
Start: 2021-10-25

## 2021-10-25 NOTE — TELEPHONE ENCOUNTER
Hypertension Medications Protocol Failed 10/25/2021 12:22 AM   Protocol Details  Appointment in past 6 or next 3 months    CMP or BMP in past 12 months    Last serum creatinine< 2.0     Last OV 3/10/21  Last lab 1/9/21 CMP/Creat 1.13  Last refilled 726/21

## 2021-10-26 ENCOUNTER — TELEPHONE (OUTPATIENT)
Dept: FAMILY MEDICINE CLINIC | Facility: CLINIC | Age: 66
End: 2021-10-26

## 2021-10-26 NOTE — TELEPHONE ENCOUNTER
PT CALLED AND ADV THAT HE WAS TO CB WITH A CONDITION F/U.    PT NOT GIVEN MUCH DETAIL TO WHAT THIS IS ABOUT.      PLEASE CALL AND ADV    THANK YOU

## 2021-10-26 NOTE — TELEPHONE ENCOUNTER
-- DO NOT REPLY / DO NOT REPLY ALL --  -- Message is from the [facility]--    General Patient Message      Reason for Call: patient is calling to inform you that he is feeling some side effects to the medication. He is sleepy and has caused him to have diarrhea. Please call him back to discuss      Caller Information       Type Contact Phone    03/03/2020 09:18 AM Phone (Incoming) Romeo Null (Self) [phone] (M)          Alternative phone number:     Turnaround time given to caller:   \"This message will be sent to [state Provider's name]. The clinical team will fulfill your request as soon as they review your message.\"}     Phone call to patient. He is still having the rib pain. Appointment made for patient to see Dr. Celena Delacruz.

## 2021-10-27 ENCOUNTER — OFFICE VISIT (OUTPATIENT)
Dept: FAMILY MEDICINE CLINIC | Facility: CLINIC | Age: 66
End: 2021-10-27
Payer: MEDICARE

## 2021-10-27 ENCOUNTER — HOSPITAL ENCOUNTER (OUTPATIENT)
Dept: GENERAL RADIOLOGY | Age: 66
Discharge: HOME | End: 2021-10-27
Attending: FAMILY MEDICINE
Payer: MEDICARE

## 2021-10-27 VITALS
HEIGHT: 66 IN | TEMPERATURE: 98 F | HEART RATE: 68 BPM | WEIGHT: 210 LBS | DIASTOLIC BLOOD PRESSURE: 84 MMHG | OXYGEN SATURATION: 98 % | BODY MASS INDEX: 33.75 KG/M2 | SYSTOLIC BLOOD PRESSURE: 139 MMHG

## 2021-10-27 DIAGNOSIS — R07.9 LEFT-SIDED CHEST PAIN: Primary | ICD-10-CM

## 2021-10-27 DIAGNOSIS — R07.9 LEFT-SIDED CHEST PAIN: ICD-10-CM

## 2021-10-27 PROCEDURE — 99214 OFFICE O/P EST MOD 30 MIN: CPT | Performed by: FAMILY MEDICINE

## 2021-10-27 PROCEDURE — 85652 RBC SED RATE AUTOMATED: CPT | Performed by: FAMILY MEDICINE

## 2021-10-27 PROCEDURE — 71046 X-RAY EXAM CHEST 2 VIEWS: CPT | Performed by: FAMILY MEDICINE

## 2021-10-27 NOTE — PROGRESS NOTES
Nacho Ureña is a 73year old male. CC:  Patient presents with:  Pain: per pt- rib pain/left center chest area      HPI:    He has had a L parasternal pain for about 3 weeks. It started a few days after getting the [employer] booster.  Pain 281.3   • Adenomatous colon polyp    • Anesthesia complication     woke up during propofol   • Arthritis of facet joint of lumbar spine    • Diverticulosis of colon (without mention of hemorrhage)    • Facet arthritis of cervical region    • Hernia, umbili Family History   Problem Relation Age of Onset   • Hypertension Father    • Cancer Mother         Breast/CA breast   • Heart Disorder Mother    • Breast Cancer Mother    • Diabetes Mother    • Other (Colon cancer) Mother    • Breast Cancer Sister    • of the sternum, l costochondral junctions or the L rib cage. NEURO: Awake and alert. Normal speech and articulation. No facial droop or asymmetry. Moving all extremities equally.     ASSESSMENT AND PLAN    1. Left-sided chest pain  Await results   Consider

## 2021-10-29 ENCOUNTER — MED REC SCAN ONLY (OUTPATIENT)
Dept: FAMILY MEDICINE CLINIC | Facility: CLINIC | Age: 66
End: 2021-10-29

## 2021-10-29 ENCOUNTER — TELEPHONE (OUTPATIENT)
Dept: FAMILY MEDICINE CLINIC | Facility: CLINIC | Age: 66
End: 2021-10-29

## 2021-10-29 NOTE — TELEPHONE ENCOUNTER
Please let patient or caregiver know or leave message that: The CT chest he had done at [facility] on 10/29/2021 was normal. I have no good explanation for his chest pain. Perhaps he should see Cardiology to ensure no cardiac process is afoot.    [address] [place] [address]

## 2021-10-30 NOTE — TELEPHONE ENCOUNTER
Patient advised of Doctor's note below. Patient verbalized understanding. Pt reports his wife sees a cardiologist Dr. Emilia Samaniego - will plan to make appt with this physician. Pt advised to notify PCP if referral order is needed. He v/u.     Will notify

## 2022-01-15 ENCOUNTER — MED REC SCAN ONLY (OUTPATIENT)
Dept: FAMILY MEDICINE CLINIC | Facility: CLINIC | Age: 67
End: 2022-01-15

## 2022-02-02 ENCOUNTER — MED REC SCAN ONLY (OUTPATIENT)
Dept: FAMILY MEDICINE CLINIC | Facility: CLINIC | Age: 67
End: 2022-02-02

## 2022-03-03 ENCOUNTER — PATIENT OUTREACH (OUTPATIENT)
Dept: FAMILY MEDICINE CLINIC | Facility: CLINIC | Age: 67
End: 2022-03-03

## 2022-03-03 ENCOUNTER — TELEPHONE (OUTPATIENT)
Dept: FAMILY MEDICINE CLINIC | Facility: CLINIC | Age: 67
End: 2022-03-03

## 2022-03-10 ENCOUNTER — OFFICE VISIT (OUTPATIENT)
Dept: FAMILY MEDICINE CLINIC | Facility: CLINIC | Age: 67
End: 2022-03-10
Payer: MEDICARE

## 2022-03-10 VITALS
WEIGHT: 217 LBS | DIASTOLIC BLOOD PRESSURE: 80 MMHG | RESPIRATION RATE: 17 BRPM | SYSTOLIC BLOOD PRESSURE: 122 MMHG | TEMPERATURE: 98 F | HEART RATE: 70 BPM | OXYGEN SATURATION: 95 % | HEIGHT: 66 IN | BODY MASS INDEX: 34.87 KG/M2

## 2022-03-10 DIAGNOSIS — M47.812 FACET ARTHRITIS OF CERVICAL REGION: ICD-10-CM

## 2022-03-10 DIAGNOSIS — Z00.00 MEDICARE ANNUAL WELLNESS VISIT, SUBSEQUENT: Primary | ICD-10-CM

## 2022-03-10 DIAGNOSIS — E78.5 HYPERLIPIDEMIA, UNSPECIFIED HYPERLIPIDEMIA TYPE: ICD-10-CM

## 2022-03-10 DIAGNOSIS — D47.2 MGUS (MONOCLONAL GAMMOPATHY OF UNKNOWN SIGNIFICANCE): ICD-10-CM

## 2022-03-10 DIAGNOSIS — E04.1 THYROID NODULE: ICD-10-CM

## 2022-03-10 DIAGNOSIS — R19.00 RETROPERITONEAL MASS: ICD-10-CM

## 2022-03-10 DIAGNOSIS — I10 ESSENTIAL HYPERTENSION: ICD-10-CM

## 2022-03-10 DIAGNOSIS — M47.816 ARTHRITIS OF FACET JOINT OF LUMBAR SPINE: ICD-10-CM

## 2022-03-10 LAB
CHOLEST SERPL-MCNC: 259 MG/DL (ref ?–200)
FASTING PATIENT LIPID ANSWER: YES
HDLC SERPL-MCNC: 89 MG/DL (ref 40–59)
LDLC SERPL CALC-MCNC: 153 MG/DL (ref ?–100)
NONHDLC SERPL-MCNC: 170 MG/DL (ref ?–130)
TRIGL SERPL-MCNC: 98 MG/DL (ref 30–149)
TSI SER-ACNC: 1.56 MIU/ML (ref 0.36–3.74)
VLDLC SERPL CALC-MCNC: 19 MG/DL (ref 0–30)

## 2022-03-10 PROCEDURE — 80061 LIPID PANEL: CPT | Performed by: FAMILY MEDICINE

## 2022-03-10 PROCEDURE — G0439 PPPS, SUBSEQ VISIT: HCPCS | Performed by: FAMILY MEDICINE

## 2022-03-10 PROCEDURE — 84443 ASSAY THYROID STIM HORMONE: CPT | Performed by: FAMILY MEDICINE

## 2022-03-10 RX ORDER — SILDENAFIL 25 MG/1
25 TABLET, FILM COATED ORAL
Refills: 0 | COMMUNITY
Start: 2022-03-10

## 2022-03-11 ENCOUNTER — TELEPHONE (OUTPATIENT)
Dept: FAMILY MEDICINE CLINIC | Facility: CLINIC | Age: 67
End: 2022-03-11

## 2022-03-11 RX ORDER — ROSUVASTATIN CALCIUM 5 MG/1
5 TABLET, COATED ORAL NIGHTLY
Qty: 90 TABLET | Refills: 0 | Status: SHIPPED | OUTPATIENT
Start: 2022-03-11

## 2022-03-11 NOTE — TELEPHONE ENCOUNTER
Patient advised of the information per Dr. Reji Moura. Patient would like to have the Crestor sent to [facility]. Recall done for follow up in 10 weeks.

## 2022-03-11 NOTE — TELEPHONE ENCOUNTER
----- Message from Gato Russ MD sent at 3/11/2022  6:55 AM CST -----  Please let patient or caregiver know or leave message that:   His thyroid function is normal.  His lipids are elevated. Unfortunately, his body does not tolerate Lipitor. Our option would be to try Crestor 5 mg, 1 tab at bedtime, #90, 0 rf. This medication is in the same class as Lipitor. Many times switching from one medicine to another in the same class will not produce the same side effects. If he were to develop the aches, we would stop the Crestor. Another option would be to try Tricor 145 mg, 1 tab every day, #90, 0 rf. Helio Glad is a different class of medication. It is as effective as the Crestor. See me back in 10 weeks if he decides to try either of the medications.   Thanks

## 2022-03-18 ENCOUNTER — OFFICE VISIT (OUTPATIENT)
Dept: FAMILY MEDICINE CLINIC | Facility: CLINIC | Age: 67
End: 2022-03-18
Payer: MEDICARE

## 2022-03-18 VITALS
BODY MASS INDEX: 36 KG/M2 | SYSTOLIC BLOOD PRESSURE: 138 MMHG | DIASTOLIC BLOOD PRESSURE: 85 MMHG | WEIGHT: 220 LBS | OXYGEN SATURATION: 97 % | HEART RATE: 77 BPM | TEMPERATURE: 98 F

## 2022-03-18 DIAGNOSIS — Z01.818 PREOP EXAMINATION: Primary | ICD-10-CM

## 2022-03-18 DIAGNOSIS — E78.5 HYPERLIPIDEMIA, UNSPECIFIED HYPERLIPIDEMIA TYPE: ICD-10-CM

## 2022-03-18 DIAGNOSIS — G89.29 CHRONIC RIGHT SHOULDER PAIN: ICD-10-CM

## 2022-03-18 DIAGNOSIS — Z98.1 S/P CERVICAL SPINAL FUSION: ICD-10-CM

## 2022-03-18 DIAGNOSIS — M25.511 CHRONIC RIGHT SHOULDER PAIN: ICD-10-CM

## 2022-03-18 PROCEDURE — 99214 OFFICE O/P EST MOD 30 MIN: CPT | Performed by: FAMILY MEDICINE

## 2022-04-16 ENCOUNTER — MED REC SCAN ONLY (OUTPATIENT)
Dept: FAMILY MEDICINE CLINIC | Facility: CLINIC | Age: 67
End: 2022-04-16

## 2022-04-25 RX ORDER — LISINOPRIL AND HYDROCHLOROTHIAZIDE 20; 12.5 MG/1; MG/1
TABLET ORAL
Qty: 90 TABLET | Refills: 3 | Status: SHIPPED | OUTPATIENT
Start: 2022-04-25

## 2022-04-25 NOTE — TELEPHONE ENCOUNTER
Hypertension Medications Protocol Failed 04/25/2022 12:28 AM   Protocol Details  CMP or BMP in past 12 months    Last serum creatinine< 2.0    Appointment in past 6 or next 3 months     Routing to provider per protocol. LISINOPRIL-HYDROCHLOROTHIAZIDE 20-12.5 MG Oral Tab  Last refilled on 10/25/21 for #90  with 1 rf. Last labs 1/9/21. Last seen on 3/18/22. No future appointments. Thank you.

## 2022-05-13 ENCOUNTER — MED REC SCAN ONLY (OUTPATIENT)
Dept: FAMILY MEDICINE CLINIC | Facility: CLINIC | Age: 67
End: 2022-05-13

## 2022-05-23 RX ORDER — ROSUVASTATIN CALCIUM 5 MG/1
TABLET, COATED ORAL
Qty: 90 TABLET | Refills: 3 | Status: SHIPPED | OUTPATIENT
Start: 2022-05-23

## 2022-06-07 ENCOUNTER — MED REC SCAN ONLY (OUTPATIENT)
Dept: FAMILY MEDICINE CLINIC | Facility: CLINIC | Age: 67
End: 2022-06-07

## 2022-07-01 ENCOUNTER — MED REC SCAN ONLY (OUTPATIENT)
Dept: FAMILY MEDICINE CLINIC | Facility: CLINIC | Age: 67
End: 2022-07-01

## 2022-07-26 ENCOUNTER — OFFICE VISIT (OUTPATIENT)
Dept: SURGERY | Facility: CLINIC | Age: 67
End: 2022-07-26
Payer: MEDICARE

## 2022-07-26 VITALS — HEART RATE: 80 BPM | WEIGHT: 215 LBS | BODY MASS INDEX: 34.55 KG/M2 | TEMPERATURE: 98 F | HEIGHT: 66 IN

## 2022-07-26 DIAGNOSIS — R10.32 LEFT LOWER QUADRANT ABDOMINAL PAIN: Primary | ICD-10-CM

## 2022-07-26 PROCEDURE — 99214 OFFICE O/P EST MOD 30 MIN: CPT | Performed by: SURGERY

## 2022-07-27 ENCOUNTER — HOSPITAL ENCOUNTER (OUTPATIENT)
Dept: CT IMAGING | Age: 67
Discharge: HOME | End: 2022-07-27
Attending: SURGERY
Payer: MEDICARE

## 2022-07-27 DIAGNOSIS — R10.32 LEFT LOWER QUADRANT ABDOMINAL PAIN: ICD-10-CM

## 2022-07-27 LAB — CREAT BLD-MCNC: 1.1 MG/DL

## 2022-07-27 PROCEDURE — 74177 CT ABD & PELVIS W/CONTRAST: CPT | Performed by: SURGERY

## 2022-07-27 PROCEDURE — 82565 ASSAY OF CREATININE: CPT

## 2022-08-15 ENCOUNTER — MED REC SCAN ONLY (OUTPATIENT)
Dept: FAMILY MEDICINE CLINIC | Facility: CLINIC | Age: 67
End: 2022-08-15

## 2022-09-06 ENCOUNTER — MED REC SCAN ONLY (OUTPATIENT)
Dept: FAMILY MEDICINE CLINIC | Facility: CLINIC | Age: 67
End: 2022-09-06

## 2022-09-07 ENCOUNTER — OFFICE VISIT (OUTPATIENT)
Dept: SURGERY | Facility: CLINIC | Age: 67
End: 2022-09-07
Payer: MEDICARE

## 2022-09-07 VITALS
OXYGEN SATURATION: 96 % | BODY MASS INDEX: 35 KG/M2 | RESPIRATION RATE: 16 BRPM | HEART RATE: 93 BPM | WEIGHT: 215 LBS | TEMPERATURE: 98 F | DIASTOLIC BLOOD PRESSURE: 73 MMHG | SYSTOLIC BLOOD PRESSURE: 137 MMHG

## 2022-09-07 DIAGNOSIS — R19.00 RETROPERITONEAL MASS: Primary | ICD-10-CM

## 2022-09-07 PROBLEM — D47.2 MGUS (MONOCLONAL GAMMOPATHY OF UNKNOWN SIGNIFICANCE): Status: ACTIVE | Noted: 2022-09-07

## 2022-09-07 PROCEDURE — 1125F AMNT PAIN NOTED PAIN PRSNT: CPT | Performed by: SURGERY

## 2022-09-07 PROCEDURE — 99214 OFFICE O/P EST MOD 30 MIN: CPT | Performed by: SURGERY

## 2022-09-26 ENCOUNTER — TELEPHONE (OUTPATIENT)
Dept: FAMILY MEDICINE CLINIC | Facility: CLINIC | Age: 67
End: 2022-09-26

## 2022-10-15 ENCOUNTER — TELEPHONE (OUTPATIENT)
Dept: FAMILY MEDICINE CLINIC | Facility: CLINIC | Age: 67
End: 2022-10-15

## 2022-10-15 NOTE — TELEPHONE ENCOUNTER
Pt was wondering if he could reschedule his appt for any day but the Thursday he currently has schedule. If not possible, pt states he will keep his appt just will have to move some things around. Please advise. Thank you!     Future Appointments   Date Time Provider Teresa Paris   10/20/2022 10:20 AM Lillian Francois MD [facility] Stacy Castro

## 2022-10-17 NOTE — TELEPHONE ENCOUNTER
Patient has been notified, verbalized understanding of information. Denies further questions.       Future Appointments   Date Time Provider Teresa Paris   10/21/2022 12:00 PM Arian Perla MD [facility] Bettina Chicas

## 2022-10-21 ENCOUNTER — OFFICE VISIT (OUTPATIENT)
Dept: FAMILY MEDICINE CLINIC | Facility: CLINIC | Age: 67
End: 2022-10-21
Payer: MEDICARE

## 2022-10-21 VITALS
RESPIRATION RATE: 16 BRPM | DIASTOLIC BLOOD PRESSURE: 76 MMHG | OXYGEN SATURATION: 98 % | WEIGHT: 219.19 LBS | SYSTOLIC BLOOD PRESSURE: 128 MMHG | TEMPERATURE: 99 F | BODY MASS INDEX: 35 KG/M2 | HEART RATE: 89 BPM

## 2022-10-21 DIAGNOSIS — Z01.818 PREOP EXAMINATION: Primary | ICD-10-CM

## 2022-10-21 DIAGNOSIS — D47.2 MGUS (MONOCLONAL GAMMOPATHY OF UNKNOWN SIGNIFICANCE): ICD-10-CM

## 2022-10-21 DIAGNOSIS — D17.71 RENAL ANGIOMYOLIPOMA: ICD-10-CM

## 2022-10-21 DIAGNOSIS — I10 PRIMARY HYPERTENSION: ICD-10-CM

## 2022-10-21 LAB
ALBUMIN SERPL-MCNC: 4.4 G/DL (ref 3.4–5)
ALBUMIN/GLOB SERPL: 1.1 {RATIO} (ref 1–2)
ALP LIVER SERPL-CCNC: 70 U/L
ALT SERPL-CCNC: 33 U/L
ANION GAP SERPL CALC-SCNC: 8 MMOL/L (ref 0–18)
AST SERPL-CCNC: 25 U/L (ref 15–37)
ATRIAL RATE: 66 BPM
BILIRUB SERPL-MCNC: 0.5 MG/DL (ref 0.1–2)
BUN BLD-MCNC: 15 MG/DL (ref 7–18)
CALCIUM BLD-MCNC: 9.7 MG/DL (ref 8.5–10.1)
CHLORIDE SERPL-SCNC: 106 MMOL/L (ref 98–112)
CO2 SERPL-SCNC: 23 MMOL/L (ref 21–32)
CREAT BLD-MCNC: 1.2 MG/DL
FASTING STATUS PATIENT QL REPORTED: NO
GFR SERPLBLD BASED ON 1.73 SQ M-ARVRAT: 67 ML/MIN/1.73M2 (ref 60–?)
GLOBULIN PLAS-MCNC: 4 G/DL (ref 2.8–4.4)
GLUCOSE BLD-MCNC: 94 MG/DL (ref 70–99)
OSMOLALITY SERPL CALC.SUM OF ELEC: 285 MOSM/KG (ref 275–295)
P AXIS: 55 DEGREES
P-R INTERVAL: 124 MS
POTASSIUM SERPL-SCNC: 3.9 MMOL/L (ref 3.5–5.1)
PROT SERPL-MCNC: 8.4 G/DL (ref 6.4–8.2)
Q-T INTERVAL: 434 MS
QRS DURATION: 96 MS
QTC CALCULATION (BEZET): 454 MS
R AXIS: 41 DEGREES
SODIUM SERPL-SCNC: 137 MMOL/L (ref 136–145)
T AXIS: 58 DEGREES
VENTRICULAR RATE: 66 BPM

## 2022-10-21 PROCEDURE — 99213 OFFICE O/P EST LOW 20 MIN: CPT | Performed by: FAMILY MEDICINE

## 2022-10-21 PROCEDURE — 80053 COMPREHEN METABOLIC PANEL: CPT | Performed by: FAMILY MEDICINE

## 2022-10-21 PROCEDURE — 93000 ELECTROCARDIOGRAM COMPLETE: CPT | Performed by: FAMILY MEDICINE

## 2022-11-01 RX ORDER — OXYMETAZOLINE HCL 0.05 %
10 SPRAY, NON-AEROSOL (ML) NASAL AS NEEDED
Status: ON HOLD | COMMUNITY
End: 2022-11-04 | Stop reason: CLARIF

## 2022-11-02 ENCOUNTER — LABORATORY ENCOUNTER (OUTPATIENT)
Dept: LAB | Age: 67
End: 2022-11-02
Attending: UROLOGY
Payer: MEDICARE

## 2022-11-02 ENCOUNTER — LAB ENCOUNTER (OUTPATIENT)
Dept: LAB | Age: 67
End: 2022-11-02
Attending: UROLOGY
Payer: MEDICARE

## 2022-11-02 DIAGNOSIS — Z01.818 PRE-OP TESTING: ICD-10-CM

## 2022-11-02 LAB
ANTIBODY SCREEN: NEGATIVE
ERYTHROCYTE [DISTWIDTH] IN BLOOD BY AUTOMATED COUNT: 12.9 %
HCT VFR BLD AUTO: 45 %
HGB BLD-MCNC: 14.9 G/DL
MCH RBC QN AUTO: 31.6 PG (ref 26–34)
MCHC RBC AUTO-ENTMCNC: 33.1 G/DL (ref 31–37)
MCV RBC AUTO: 95.3 FL
PLATELET # BLD AUTO: 265 10(3)UL (ref 150–450)
RBC # BLD AUTO: 4.72 X10(6)UL
RH BLOOD TYPE: POSITIVE
WBC # BLD AUTO: 8 X10(3) UL (ref 4–11)

## 2022-11-02 PROCEDURE — 85027 COMPLETE CBC AUTOMATED: CPT

## 2022-11-02 PROCEDURE — 86900 BLOOD TYPING SEROLOGIC ABO: CPT

## 2022-11-02 PROCEDURE — 86901 BLOOD TYPING SEROLOGIC RH(D): CPT

## 2022-11-02 PROCEDURE — 36415 COLL VENOUS BLD VENIPUNCTURE: CPT

## 2022-11-02 PROCEDURE — 86850 RBC ANTIBODY SCREEN: CPT

## 2022-11-03 LAB — SARS-COV-2 RNA RESP QL NAA+PROBE: NOT DETECTED

## 2022-11-04 ENCOUNTER — HOSPITAL ENCOUNTER (INPATIENT)
Facility: HOSPITAL | Age: 67
LOS: 1 days | Discharge: HOME | End: 2022-11-05
Attending: UROLOGY | Admitting: UROLOGY
Payer: MEDICARE

## 2022-11-04 ENCOUNTER — ANESTHESIA EVENT (OUTPATIENT)
Dept: SURGERY | Facility: HOSPITAL | Age: 67
End: 2022-11-04
Payer: MEDICARE

## 2022-11-04 ENCOUNTER — ANESTHESIA (OUTPATIENT)
Dept: SURGERY | Facility: HOSPITAL | Age: 67
End: 2022-11-04
Payer: MEDICARE

## 2022-11-04 DIAGNOSIS — Z01.818 PRE-OP TESTING: Primary | ICD-10-CM

## 2022-11-04 PROBLEM — D17.71 ANGIOMYOLIPOMA OF LEFT KIDNEY: Status: ACTIVE | Noted: 2022-11-04

## 2022-11-04 PROCEDURE — 3E0T3BZ INTRODUCTION OF ANESTHETIC AGENT INTO PERIPHERAL NERVES AND PLEXI, PERCUTANEOUS APPROACH: ICD-10-PCS | Performed by: ANESTHESIOLOGY

## 2022-11-04 PROCEDURE — 0WBH4ZZ EXCISION OF RETROPERITONEUM, PERCUTANEOUS ENDOSCOPIC APPROACH: ICD-10-PCS | Performed by: UROLOGY

## 2022-11-04 PROCEDURE — 88307 TISSUE EXAM BY PATHOLOGIST: CPT | Performed by: UROLOGY

## 2022-11-04 PROCEDURE — 76942 ECHO GUIDE FOR BIOPSY: CPT | Performed by: ANESTHESIOLOGY

## 2022-11-04 PROCEDURE — 88341 IMHCHEM/IMCYTCHM EA ADD ANTB: CPT | Performed by: UROLOGY

## 2022-11-04 PROCEDURE — 88342 IMHCHEM/IMCYTCHM 1ST ANTB: CPT | Performed by: UROLOGY

## 2022-11-04 PROCEDURE — 8E0W4CZ ROBOTIC ASSISTED PROCEDURE OF TRUNK REGION, PERCUTANEOUS ENDOSCOPIC APPROACH: ICD-10-PCS | Performed by: UROLOGY

## 2022-11-04 RX ORDER — HYDROMORPHONE HYDROCHLORIDE 1 MG/ML
0.6 INJECTION, SOLUTION INTRAMUSCULAR; INTRAVENOUS; SUBCUTANEOUS EVERY 5 MIN PRN
Status: DISCONTINUED | OUTPATIENT
Start: 2022-11-04 | End: 2022-11-04 | Stop reason: HOSPADM

## 2022-11-04 RX ORDER — PHENYLEPHRINE HCL 10 MG/ML
VIAL (ML) INJECTION AS NEEDED
Status: DISCONTINUED | OUTPATIENT
Start: 2022-11-04 | End: 2022-11-04 | Stop reason: SURG

## 2022-11-04 RX ORDER — LIDOCAINE HYDROCHLORIDE ANHYDROUS AND DEXTROSE MONOHYDRATE .8; 5 G/100ML; G/100ML
INJECTION, SOLUTION INTRAVENOUS CONTINUOUS PRN
Status: DISCONTINUED | OUTPATIENT
Start: 2022-11-04 | End: 2022-11-04 | Stop reason: SURG

## 2022-11-04 RX ORDER — HYDROMORPHONE HYDROCHLORIDE 1 MG/ML
INJECTION, SOLUTION INTRAMUSCULAR; INTRAVENOUS; SUBCUTANEOUS
Status: COMPLETED
Start: 2022-11-04 | End: 2022-11-04

## 2022-11-04 RX ORDER — CEFAZOLIN SODIUM/WATER 2 G/20 ML
2 SYRINGE (ML) INTRAVENOUS ONCE
Status: COMPLETED | OUTPATIENT
Start: 2022-11-04 | End: 2022-11-04

## 2022-11-04 RX ORDER — HEPARIN SODIUM 5000 [USP'U]/ML
INJECTION, SOLUTION INTRAVENOUS; SUBCUTANEOUS
Status: DISPENSED
Start: 2022-11-04 | End: 2022-11-04

## 2022-11-04 RX ORDER — FAMOTIDINE 20 MG/1
20 TABLET, FILM COATED ORAL 2 TIMES DAILY
Status: DISCONTINUED | OUTPATIENT
Start: 2022-11-04 | End: 2022-11-05

## 2022-11-04 RX ORDER — METOCLOPRAMIDE HYDROCHLORIDE 5 MG/ML
INJECTION INTRAMUSCULAR; INTRAVENOUS AS NEEDED
Status: DISCONTINUED | OUTPATIENT
Start: 2022-11-04 | End: 2022-11-04 | Stop reason: SURG

## 2022-11-04 RX ORDER — ACETAMINOPHEN 325 MG/1
650 TABLET ORAL EVERY 6 HOURS PRN
Status: DISCONTINUED | OUTPATIENT
Start: 2022-11-04 | End: 2022-11-05

## 2022-11-04 RX ORDER — LISINOPRIL AND HYDROCHLOROTHIAZIDE 20; 12.5 MG/1; MG/1
1 TABLET ORAL DAILY
Status: DISCONTINUED | OUTPATIENT
Start: 2022-11-04 | End: 2022-11-04 | Stop reason: SDUPTHER

## 2022-11-04 RX ORDER — KETOROLAC TROMETHAMINE 30 MG/ML
INJECTION, SOLUTION INTRAMUSCULAR; INTRAVENOUS
Status: COMPLETED
Start: 2022-11-04 | End: 2022-11-04

## 2022-11-04 RX ORDER — OXYCODONE HYDROCHLORIDE 5 MG/1
5 TABLET ORAL EVERY 4 HOURS PRN
Status: DISCONTINUED | OUTPATIENT
Start: 2022-11-04 | End: 2022-11-05

## 2022-11-04 RX ORDER — SODIUM CHLORIDE, SODIUM LACTATE, POTASSIUM CHLORIDE, CALCIUM CHLORIDE 600; 310; 30; 20 MG/100ML; MG/100ML; MG/100ML; MG/100ML
INJECTION, SOLUTION INTRAVENOUS CONTINUOUS
Status: DISCONTINUED | OUTPATIENT
Start: 2022-11-04 | End: 2022-11-04 | Stop reason: HOSPADM

## 2022-11-04 RX ORDER — ENOXAPARIN SODIUM 100 MG/ML
40 INJECTION SUBCUTANEOUS DAILY
Status: DISCONTINUED | OUTPATIENT
Start: 2022-11-05 | End: 2022-11-05

## 2022-11-04 RX ORDER — KETOROLAC TROMETHAMINE 30 MG/ML
30 INJECTION, SOLUTION INTRAMUSCULAR; INTRAVENOUS ONCE
Status: COMPLETED | OUTPATIENT
Start: 2022-11-04 | End: 2022-11-04

## 2022-11-04 RX ORDER — ACETAMINOPHEN/DIPHENHYDRAMINE 500MG-25MG
1 TABLET ORAL NIGHTLY
COMMUNITY

## 2022-11-04 RX ORDER — BUPIVACAINE HYDROCHLORIDE 2.5 MG/ML
INJECTION, SOLUTION EPIDURAL; INFILTRATION; INTRACAUDAL AS NEEDED
Status: DISCONTINUED | OUTPATIENT
Start: 2022-11-04 | End: 2022-11-04 | Stop reason: HOSPADM

## 2022-11-04 RX ORDER — SENNOSIDES 8.6 MG
17.2 TABLET ORAL NIGHTLY PRN
Status: DISCONTINUED | OUTPATIENT
Start: 2022-11-04 | End: 2022-11-05

## 2022-11-04 RX ORDER — NALOXONE HYDROCHLORIDE 0.4 MG/ML
80 INJECTION, SOLUTION INTRAMUSCULAR; INTRAVENOUS; SUBCUTANEOUS AS NEEDED
Status: DISCONTINUED | OUTPATIENT
Start: 2022-11-04 | End: 2022-11-04 | Stop reason: HOSPADM

## 2022-11-04 RX ORDER — HYDROMORPHONE HYDROCHLORIDE 1 MG/ML
0.4 INJECTION, SOLUTION INTRAMUSCULAR; INTRAVENOUS; SUBCUTANEOUS EVERY 2 HOUR PRN
Status: DISCONTINUED | OUTPATIENT
Start: 2022-11-04 | End: 2022-11-05

## 2022-11-04 RX ORDER — MIDAZOLAM HYDROCHLORIDE 1 MG/ML
INJECTION INTRAMUSCULAR; INTRAVENOUS AS NEEDED
Status: DISCONTINUED | OUTPATIENT
Start: 2022-11-04 | End: 2022-11-04 | Stop reason: SURG

## 2022-11-04 RX ORDER — HYDROMORPHONE HYDROCHLORIDE 1 MG/ML
0.8 INJECTION, SOLUTION INTRAMUSCULAR; INTRAVENOUS; SUBCUTANEOUS EVERY 2 HOUR PRN
Status: DISCONTINUED | OUTPATIENT
Start: 2022-11-04 | End: 2022-11-05

## 2022-11-04 RX ORDER — ONDANSETRON 2 MG/ML
4 INJECTION INTRAMUSCULAR; INTRAVENOUS EVERY 6 HOURS PRN
Status: DISCONTINUED | OUTPATIENT
Start: 2022-11-04 | End: 2022-11-05

## 2022-11-04 RX ORDER — HYDRALAZINE HYDROCHLORIDE 20 MG/ML
5 INJECTION INTRAMUSCULAR; INTRAVENOUS EVERY 6 HOURS PRN
Status: DISCONTINUED | OUTPATIENT
Start: 2022-11-04 | End: 2022-11-05

## 2022-11-04 RX ORDER — HYDROMORPHONE HYDROCHLORIDE 1 MG/ML
0.2 INJECTION, SOLUTION INTRAMUSCULAR; INTRAVENOUS; SUBCUTANEOUS EVERY 5 MIN PRN
Status: DISCONTINUED | OUTPATIENT
Start: 2022-11-04 | End: 2022-11-04 | Stop reason: HOSPADM

## 2022-11-04 RX ORDER — ACETAMINOPHEN 500 MG
1000 TABLET ORAL ONCE
Status: DISCONTINUED | OUTPATIENT
Start: 2022-11-04 | End: 2022-11-04 | Stop reason: HOSPADM

## 2022-11-04 RX ORDER — LISINOPRIL AND HYDROCHLOROTHIAZIDE 20; 12.5 MG/1; MG/1
1 TABLET ORAL DAILY
COMMUNITY

## 2022-11-04 RX ORDER — KETAMINE HYDROCHLORIDE 50 MG/ML
INJECTION, SOLUTION, CONCENTRATE INTRAMUSCULAR; INTRAVENOUS AS NEEDED
Status: DISCONTINUED | OUTPATIENT
Start: 2022-11-04 | End: 2022-11-04 | Stop reason: SURG

## 2022-11-04 RX ORDER — SODIUM CHLORIDE, SODIUM LACTATE, POTASSIUM CHLORIDE, CALCIUM CHLORIDE 600; 310; 30; 20 MG/100ML; MG/100ML; MG/100ML; MG/100ML
INJECTION, SOLUTION INTRAVENOUS CONTINUOUS
Status: DISCONTINUED | OUTPATIENT
Start: 2022-11-04 | End: 2022-11-04

## 2022-11-04 RX ORDER — CEFAZOLIN SODIUM/WATER 2 G/20 ML
SYRINGE (ML) INTRAVENOUS
Status: DISPENSED
Start: 2022-11-04 | End: 2022-11-04

## 2022-11-04 RX ORDER — OXYCODONE HYDROCHLORIDE 10 MG/1
10 TABLET ORAL EVERY 4 HOURS PRN
Status: DISCONTINUED | OUTPATIENT
Start: 2022-11-04 | End: 2022-11-05

## 2022-11-04 RX ORDER — CEFAZOLIN SODIUM/WATER 2 G/20 ML
2 SYRINGE (ML) INTRAVENOUS EVERY 8 HOURS
Status: COMPLETED | OUTPATIENT
Start: 2022-11-04 | End: 2022-11-05

## 2022-11-04 RX ORDER — HYDROMORPHONE HYDROCHLORIDE 1 MG/ML
0.4 INJECTION, SOLUTION INTRAMUSCULAR; INTRAVENOUS; SUBCUTANEOUS EVERY 5 MIN PRN
Status: DISCONTINUED | OUTPATIENT
Start: 2022-11-04 | End: 2022-11-04 | Stop reason: HOSPADM

## 2022-11-04 RX ORDER — ONDANSETRON 4 MG/1
4 TABLET, FILM COATED ORAL EVERY 6 HOURS PRN
Status: DISCONTINUED | OUTPATIENT
Start: 2022-11-04 | End: 2022-11-05

## 2022-11-04 RX ORDER — ROCURONIUM BROMIDE 10 MG/ML
INJECTION, SOLUTION INTRAVENOUS AS NEEDED
Status: DISCONTINUED | OUTPATIENT
Start: 2022-11-04 | End: 2022-11-04 | Stop reason: SURG

## 2022-11-04 RX ORDER — HEPARIN SODIUM 5000 [USP'U]/ML
5000 INJECTION, SOLUTION INTRAVENOUS; SUBCUTANEOUS ONCE
Status: COMPLETED | OUTPATIENT
Start: 2022-11-04 | End: 2022-11-04

## 2022-11-04 RX ORDER — SODIUM CHLORIDE 9 MG/ML
INJECTION, SOLUTION INTRAVENOUS CONTINUOUS
Status: DISCONTINUED | OUTPATIENT
Start: 2022-11-04 | End: 2022-11-05

## 2022-11-04 RX ORDER — GLYCOPYRROLATE 0.2 MG/ML
INJECTION, SOLUTION INTRAMUSCULAR; INTRAVENOUS AS NEEDED
Status: DISCONTINUED | OUTPATIENT
Start: 2022-11-04 | End: 2022-11-04 | Stop reason: SURG

## 2022-11-04 RX ADMIN — ROCURONIUM BROMIDE 50 MG: 10 INJECTION, SOLUTION INTRAVENOUS at 12:22:00

## 2022-11-04 RX ADMIN — CEFAZOLIN SODIUM/WATER 2 G: 2 G/20 ML SYRINGE (ML) INTRAVENOUS at 12:13:00

## 2022-11-04 RX ADMIN — ROCURONIUM BROMIDE 50 MG: 10 INJECTION, SOLUTION INTRAVENOUS at 13:25:00

## 2022-11-04 RX ADMIN — MIDAZOLAM HYDROCHLORIDE 2 MG: 1 INJECTION INTRAMUSCULAR; INTRAVENOUS at 12:12:00

## 2022-11-04 RX ADMIN — SODIUM CHLORIDE, SODIUM LACTATE, POTASSIUM CHLORIDE, CALCIUM CHLORIDE: 600; 310; 30; 20 INJECTION, SOLUTION INTRAVENOUS at 12:52:00

## 2022-11-04 RX ADMIN — PHENYLEPHRINE HCL 40 MCG: 10 MG/ML VIAL (ML) INJECTION at 13:39:00

## 2022-11-04 RX ADMIN — LIDOCAINE HYDROCHLORIDE ANHYDROUS AND DEXTROSE MONOHYDRATE 2 MG/KG/HR: .8; 5 INJECTION, SOLUTION INTRAVENOUS at 12:30:00

## 2022-11-04 RX ADMIN — METOCLOPRAMIDE HYDROCHLORIDE 10 MG: 5 INJECTION INTRAMUSCULAR; INTRAVENOUS at 12:55:00

## 2022-11-04 RX ADMIN — KETAMINE HYDROCHLORIDE 50 MG: 50 INJECTION, SOLUTION, CONCENTRATE INTRAMUSCULAR; INTRAVENOUS at 12:55:00

## 2022-11-04 RX ADMIN — GLYCOPYRROLATE 0.2 MG: 0.2 INJECTION, SOLUTION INTRAMUSCULAR; INTRAVENOUS at 13:00:00

## 2022-11-04 NOTE — ANESTHESIA PROCEDURE NOTES
Regional Block    Date/Time: 11/4/2022 12:22 PM  Performed by: Kerry Moore MD  Authorized by: Kerry Moore MD       General Information and Staff    Start Time:  11/4/2022 12:22 PM  End Time:  11/4/2022 12:25 PM  Anesthesiologist:  Susan Ley MD  Performed by: Anesthesiologist  Patient Location:  OR      Site Identification: real time ultrasound guided and image stored and retrievable    Block site/laterality marked before start: site marked  Reason for Block: at surgeon's request and post-op pain management    Preanesthetic Checklist: 2 patient identifers, IV checked, site marked, risks and benefits discussed, monitors and equipment checked, pre-op evaluation, timeout performed, anesthesia consent, sterile technique used, no prohibitive neurological deficits and no local skin infection at insertion site      Procedure Details    Patient Position:   Prep: ChloraPrep    Monitoring:  Cardiac monitor, continuous pulse ox and blood pressure cuff  Block Type:  TAP  Laterality:  Left  Injection Technique:  Single-shot    Needle    Needle Type:  Short-bevel and echogenic  Needle Gauge:  20 G  Needle Length:  100 mm  Needle Localization:  Ultrasound guidance  Reason for Ultrasound Use: appropriate spread of the medication was noted in real time and no ultrasound evidence of intravascular and/or intraneural injection            Assessment    Injection Assessment:  Good spread noted, negative resistance, negative aspiration for heme, incremental injection and low pressure  Heart Rate Change: No    - Patient tolerated block procedure well without evidence of immediate block related complications.      Medications  11/4/2022 12:22 PM      Additional Comments    Medication:  Bupivacaine 0.25% 30mL with PF-dexamethasone 2mg

## 2022-11-04 NOTE — ANESTHESIA POSTPROCEDURE EVALUATION
[facility] Patient Status:  Inpatient   Age/Gender 78year old male MRN [id number]   Location [facility] Attending David Craig MD   Hosp Day # 0 PCP Kevin Bee MD       Anesthesia Post-op Note    XI ROBOTIC ASSISTED LAPAROSCOPIC LEFT PARTIAL NEPHRECTOMY, EXCISION OF RENAL ANGIOMYOLIPOMA    Procedure Summary     Date: 11/04/22 Room / Location: [address] [place] [address] OR 09 / [facility] OR    Anesthesia Start: 8470 Anesthesia Stop: 7485    Procedure: XI ROBOTIC ASSISTED LAPAROSCOPIC LEFT PARTIAL NEPHRECTOMY, EXCISION OF RENAL ANGIOMYOLIPOMA (Left: Abdomen) Diagnosis: (LEFT RENAL MASS)    Surgeons: David Craig MD Anesthesiologist: Anais Campbell MD    Anesthesia Type: general ASA Status: 2          Anesthesia Type: general    Vitals Value Taken Time   /72 11/04/22 1600   Temp 98 11/04/22 1600   Pulse 75 11/04/22 1600   Resp 20 11/04/22 1600   SpO2 96 11/04/22 1600       Patient Location: PACU    Anesthesia Type: general    Airway Patency: extubated    Postop Pain Control: adequate    Mental Status: mildly sedated but able to meaningfully participate in the post-anesthesia evaluation    Nausea/Vomiting: none    Cardiopulmonary/Hydration status: stable euvolemic    Complications: no apparent anesthesia related complications    Postop vital signs: stable    Dental Exam: Unchanged from Preop    Patient to be discharged from PACU when criteria met.

## 2022-11-04 NOTE — ANESTHESIA PROCEDURE NOTES
Airway  Date/Time: 11/4/2022 12:20 PM  Urgency: elective      General Information and Staff    Patient location during procedure: OR  Anesthesiologist: Jimmie Moore MD  Performed: anesthesiologist     Indications and Patient Condition  Indications for airway management: anesthesia  Spontaneous Ventilation: absent  Sedation level: deep  Preoxygenated: yes  Patient position: sniffing  Mask difficulty assessment: 1 - vent by mask    Final Airway Details  Final airway type: endotracheal airway      Successful airway: ETT  Cuffed: yes   Successful intubation technique: Video laryngoscopy  Facilitating devices/methods: intubating stylet  Endotracheal tube insertion site: oral  Blade: GlideScope  Blade size: #4    Placement verified by: chest auscultation and capnometry   Cuff volume (mL): 9  Measured from: teeth  ETT to teeth (cm): 24  Number of attempts at approach: 1  Ventilation between attempts: none  Number of other approaches attempted: 0

## 2022-11-04 NOTE — PLAN OF CARE
NURSING ADMISSION NOTE      Patient admitted via  Bed. Oriented to room. Safety precautions initiated. Bed in low position. Call light in reach. A&Ox4. 3L via NC.  WDL. SCDs in place. Lovenox for VTE proph. Tolerating clears. Denies passing flatus. Abdomen soft and round. Wilcox w/ clear, navjot output. SBA. IVF infusing. Laps x4 & midline, C/D/I. Denies SOB, CP, N/V, lightheadedness. C/o mild/moderate pain, managed per MAR. Hospitalist consulted. POC discussed, all questions answered and concerns addressed. Safety precautions in place. Frequent rounds performed. Problem: PAIN - ADULT  Goal: Verbalizes/displays adequate comfort level or patient's stated pain goal  Description: INTERVENTIONS:  - Encourage pt to monitor pain and request assistance  - Assess pain using appropriate pain scale  - Administer analgesics based on type and severity of pain and evaluate response  - Implement non-pharmacological measures as appropriate and evaluate response  - Consider cultural and social influences on pain and pain management  - Manage/alleviate anxiety  - Utilize distraction and/or relaxation techniques  - Monitor for opioid side effects  - Notify MD/LIP if interventions unsuccessful or patient reports new pain  - Anticipate increased pain with activity and pre-medicate as appropriate  Outcome: Progressing     Problem: RISK FOR INFECTION - ADULT  Goal: Absence of fever/infection during anticipated neutropenic period  Description: INTERVENTIONS  - Monitor WBC  - Administer growth factors as ordered  - Implement neutropenic guidelines  Outcome: Progressing     Problem: SAFETY ADULT - FALL  Goal: Free from fall injury  Description: INTERVENTIONS:  - Assess pt frequently for physical needs  - Identify cognitive and physical deficits and behaviors that affect risk of falls.   - [place] fall precautions as indicated by assessment.  - Educate pt/family on patient safety including physical limitations  - Instruct pt to call for assistance with activity based on assessment  - Modify environment to reduce risk of injury  - Provide assistive devices as appropriate  - Consider OT/PT consult to assist with strengthening/mobility  - Encourage toileting schedule  Outcome: Progressing     Problem: DISCHARGE PLANNING  Goal: Discharge to home or other facility with appropriate resources  Description: INTERVENTIONS:  - Identify barriers to discharge w/pt and caregiver  - Include patient/family/discharge partner in discharge planning  - Arrange for needed discharge resources and transportation as appropriate  - Identify discharge learning needs (meds, wound care, etc)  - Arrange for interpreters to assist at discharge as needed  - Consider post-discharge preferences of patient/family/discharge partner  - Complete POLST form as appropriate  - Assess patient's ability to be responsible for managing their own health  - Refer to Case Management Department for coordinating discharge planning if the patient needs post-hospital services based on physician/LIP order or complex needs related to functional status, cognitive ability or social support system  Outcome: Progressing

## 2022-11-05 VITALS
OXYGEN SATURATION: 98 % | HEART RATE: 78 BPM | RESPIRATION RATE: 18 BRPM | WEIGHT: 217.19 LBS | BODY MASS INDEX: 34.49 KG/M2 | TEMPERATURE: 98 F | SYSTOLIC BLOOD PRESSURE: 152 MMHG | HEIGHT: 66.5 IN | DIASTOLIC BLOOD PRESSURE: 70 MMHG

## 2022-11-05 LAB
ANION GAP SERPL CALC-SCNC: 6 MMOL/L (ref 0–18)
BUN BLD-MCNC: 17 MG/DL (ref 7–18)
CALCIUM BLD-MCNC: 8.9 MG/DL (ref 8.5–10.1)
CHLORIDE SERPL-SCNC: 108 MMOL/L (ref 98–112)
CO2 SERPL-SCNC: 24 MMOL/L (ref 21–32)
CREAT BLD-MCNC: 1.01 MG/DL
ERYTHROCYTE [DISTWIDTH] IN BLOOD BY AUTOMATED COUNT: 12.4 %
GFR SERPLBLD BASED ON 1.73 SQ M-ARVRAT: 82 ML/MIN/1.73M2 (ref 60–?)
GLUCOSE BLD-MCNC: 112 MG/DL (ref 70–99)
HCT VFR BLD AUTO: 38.5 %
HGB BLD-MCNC: 13.1 G/DL
MCH RBC QN AUTO: 31.6 PG (ref 26–34)
MCHC RBC AUTO-ENTMCNC: 34 G/DL (ref 31–37)
MCV RBC AUTO: 92.8 FL
OSMOLALITY SERPL CALC.SUM OF ELEC: 288 MOSM/KG (ref 275–295)
PLATELET # BLD AUTO: 224 10(3)UL (ref 150–450)
POTASSIUM SERPL-SCNC: 4.2 MMOL/L (ref 3.5–5.1)
RBC # BLD AUTO: 4.15 X10(6)UL
SODIUM SERPL-SCNC: 138 MMOL/L (ref 136–145)
WBC # BLD AUTO: 11.2 X10(3) UL (ref 4–11)

## 2022-11-05 PROCEDURE — 80048 BASIC METABOLIC PNL TOTAL CA: CPT | Performed by: UROLOGY

## 2022-11-05 PROCEDURE — 85027 COMPLETE CBC AUTOMATED: CPT | Performed by: UROLOGY

## 2022-11-05 NOTE — PLAN OF CARE
A&O x4, hopeful for discharge today. Denies any CP, ROSALINDA, or calf pain at present. Lungs clear bilaterally. Abdomen rounded, soft, tender. Bowel sounds active, pt reports passing flatus and tolerating diet well. Lap sites c/d/I. Voiding after catheter removal this am. SL. Pt encouraged to sit up in chair, use IS, ambulate frequently. POC discussed and pt verbalizes understanding. Pt resting in bed, call light within reach, safety precautions in place. Problem: PAIN - ADULT  Goal: Verbalizes/displays adequate comfort level or patient's stated pain goal  Description: INTERVENTIONS:  - Encourage pt to monitor pain and request assistance  - Assess pain using appropriate pain scale  - Administer analgesics based on type and severity of pain and evaluate response  - Implement non-pharmacological measures as appropriate and evaluate response  - Consider cultural and social influences on pain and pain management  - Manage/alleviate anxiety  - Utilize distraction and/or relaxation techniques  - Monitor for opioid side effects  - Notify MD/LIP if interventions unsuccessful or patient reports new pain  - Anticipate increased pain with activity and pre-medicate as appropriate  Outcome: Progressing     Problem: RISK FOR INFECTION - ADULT  Goal: Absence of fever/infection during anticipated neutropenic period  Description: INTERVENTIONS  - Monitor WBC  - Administer growth factors as ordered  - Implement neutropenic guidelines  Outcome: Progressing     Problem: SAFETY ADULT - FALL  Goal: Free from fall injury  Description: INTERVENTIONS:  - Assess pt frequently for physical needs  - Identify cognitive and physical deficits and behaviors that affect risk of falls.   - [place] fall precautions as indicated by assessment.  - Educate pt/family on patient safety including physical limitations  - Instruct pt to call for assistance with activity based on assessment  - Modify environment to reduce risk of injury  - Provide assistive devices as appropriate  - Consider OT/PT consult to assist with strengthening/mobility  - Encourage toileting schedule  Outcome: Progressing     Problem: DISCHARGE PLANNING  Goal: Discharge to home or other facility with appropriate resources  Description: INTERVENTIONS:  - Identify barriers to discharge w/pt and caregiver  - Include patient/family/discharge partner in discharge planning  - Arrange for needed discharge resources and transportation as appropriate  - Identify discharge learning needs (meds, wound care, etc)  - Arrange for interpreters to assist at discharge as needed  - Consider post-discharge preferences of patient/family/discharge partner  - Complete POLST form as appropriate  - Assess patient's ability to be responsible for managing their own health  - Refer to Case Management Department for coordinating discharge planning if the patient needs post-hospital services based on physician/LIP order or complex needs related to functional status, cognitive ability or social support system  Outcome: Progressing

## 2022-11-05 NOTE — DISCHARGE INSTRUCTIONS
Avoid heavy lifting > 10-15lbs. Avoid bending, pushing, pulling, shoving. Avoid strenuous activity such as physical workouts, mowing the lawn, vacuuming, scrubbing the floors, etc.  You may go up and down the stairs slowly. Continue to walk at home to prevent blood clots in the legs. Continue to use incentive spirometer to prevent pneumonia after surgery. You may shower. Let the soap and water run over your incisions, pat dry when done. Avoid any hair dye or chemicals on incisions. Avoid tub bathing and submerging incisions. Diet as tolerated. Smaller, more frequent meals are encouraged instead of three larger meals that can make you more bloated. Tylenol as needed for any pain. NO aspirin. Ok for ibuprofen in 2 days.     Please call Dr. Johnston Side office if:  -you have any signs/symptoms of an infection (fevers, chills)  -your pain is uncontrolled with your prescription pain medication  -your incisions are excessively red/bruised/bleeding, any pus-like drainage, warmth to the touch   -you are unable to urinate or have a bowel movement  -any other questions you may have     Follow up in 1 week with Dr. Darcy Morgan

## 2022-11-05 NOTE — PLAN OF CARE
NURSING DISCHARGE NOTE    Discharged Home via Ambulatory. Accompanied by Support staff  Belongings Taken by patient/family. Pt in stable condition and reports feeling ready to go home. Discharge instructions given, pt verbalizes understanding. All questions answered.

## 2022-11-05 NOTE — PLAN OF CARE
Patient alert x 4, room air. Standby assist. Patient medicated for pain see MAR. Patient tolerating diet. IVF infusing. Wilcox intact draining clear yellow urine. Safety precautions maintained. Problem: PAIN - ADULT  Goal: Verbalizes/displays adequate comfort level or patient's stated pain goal  Description: INTERVENTIONS:  - Encourage pt to monitor pain and request assistance  - Assess pain using appropriate pain scale  - Administer analgesics based on type and severity of pain and evaluate response  - Implement non-pharmacological measures as appropriate and evaluate response  - Consider cultural and social influences on pain and pain management  - Manage/alleviate anxiety  - Utilize distraction and/or relaxation techniques  - Monitor for opioid side effects  - Notify MD/LIP if interventions unsuccessful or patient reports new pain  - Anticipate increased pain with activity and pre-medicate as appropriate  Outcome: Progressing     Problem: RISK FOR INFECTION - ADULT  Goal: Absence of fever/infection during anticipated neutropenic period  Description: INTERVENTIONS  - Monitor WBC  - Administer growth factors as ordered  - Implement neutropenic guidelines  Outcome: Progressing     Problem: SAFETY ADULT - FALL  Goal: Free from fall injury  Description: INTERVENTIONS:  - Assess pt frequently for physical needs  - Identify cognitive and physical deficits and behaviors that affect risk of falls.   - [place] fall precautions as indicated by assessment.  - Educate pt/family on patient safety including physical limitations  - Instruct pt to call for assistance with activity based on assessment  - Modify environment to reduce risk of injury  - Provide assistive devices as appropriate  - Consider OT/PT consult to assist with strengthening/mobility  - Encourage toileting schedule  Outcome: Progressing     Problem: DISCHARGE PLANNING  Goal: Discharge to home or other facility with appropriate resources  Description: INTERVENTIONS:  - Identify barriers to discharge w/pt and caregiver  - Include patient/family/discharge partner in discharge planning  - Arrange for needed discharge resources and transportation as appropriate  - Identify discharge learning needs (meds, wound care, etc)  - Arrange for interpreters to assist at discharge as needed  - Consider post-discharge preferences of patient/family/discharge partner  - Complete POLST form as appropriate  - Assess patient's ability to be responsible for managing their own health  - Refer to Case Management Department for coordinating discharge planning if the patient needs post-hospital services based on physician/LIP order or complex needs related to functional status, cognitive ability or social support system  Outcome: Progressing

## 2022-11-30 ENCOUNTER — MED REC SCAN ONLY (OUTPATIENT)
Dept: FAMILY MEDICINE CLINIC | Facility: CLINIC | Age: 67
End: 2022-11-30

## 2022-12-27 ENCOUNTER — MED REC SCAN ONLY (OUTPATIENT)
Dept: FAMILY MEDICINE CLINIC | Facility: CLINIC | Age: 67
End: 2022-12-27

## 2023-02-09 ENCOUNTER — TELEPHONE (OUTPATIENT)
Dept: FAMILY MEDICINE CLINIC | Facility: CLINIC | Age: 68
End: 2023-02-09

## 2023-02-09 NOTE — TELEPHONE ENCOUNTER
Patient's name and  verified   Patient informed that Disability paperwork filled out and dropped in the mail to patient today.

## 2023-02-21 ENCOUNTER — MED REC SCAN ONLY (OUTPATIENT)
Dept: FAMILY MEDICINE CLINIC | Facility: CLINIC | Age: 68
End: 2023-02-21

## 2023-03-09 ENCOUNTER — OFFICE VISIT (OUTPATIENT)
Dept: FAMILY MEDICINE CLINIC | Facility: CLINIC | Age: 68
End: 2023-03-09
Payer: MEDICARE

## 2023-03-09 VITALS
HEART RATE: 66 BPM | SYSTOLIC BLOOD PRESSURE: 150 MMHG | OXYGEN SATURATION: 98 % | RESPIRATION RATE: 16 BRPM | HEIGHT: 66.25 IN | DIASTOLIC BLOOD PRESSURE: 80 MMHG | BODY MASS INDEX: 35.52 KG/M2 | TEMPERATURE: 97 F | WEIGHT: 221 LBS

## 2023-03-09 DIAGNOSIS — I10 PRIMARY HYPERTENSION: ICD-10-CM

## 2023-03-09 DIAGNOSIS — Z00.00 MEDICARE ANNUAL WELLNESS VISIT, SUBSEQUENT: ICD-10-CM

## 2023-03-09 DIAGNOSIS — E78.5 HYPERLIPIDEMIA, UNSPECIFIED HYPERLIPIDEMIA TYPE: ICD-10-CM

## 2023-03-09 DIAGNOSIS — L98.9 FACE LESION: Primary | ICD-10-CM

## 2023-03-09 DIAGNOSIS — R93.1 ABNORMAL HEART SCORE CT: ICD-10-CM

## 2023-03-09 PROBLEM — E04.1 LEFT THYROID NODULE: Status: RESOLVED | Noted: 2020-08-17 | Resolved: 2023-03-09

## 2023-03-09 PROBLEM — R19.00 RETROPERITONEAL MASS: Status: RESOLVED | Noted: 2020-08-17 | Resolved: 2023-03-09

## 2023-03-09 PROBLEM — E78.49 OTHER HYPERLIPIDEMIA: Status: ACTIVE | Noted: 2023-03-09

## 2023-03-09 PROBLEM — D17.71 ANGIOMYOLIPOMA OF LEFT KIDNEY: Status: RESOLVED | Noted: 2022-11-04 | Resolved: 2023-03-09

## 2023-03-09 PROBLEM — M47.816 ARTHRITIS OF FACET JOINT OF LUMBAR SPINE: Status: RESOLVED | Noted: 2020-01-23 | Resolved: 2023-03-09

## 2023-03-09 LAB
CHOLEST SERPL-MCNC: 235 MG/DL (ref ?–200)
FASTING PATIENT LIPID ANSWER: YES
HDLC SERPL-MCNC: 87 MG/DL (ref 40–59)
LDLC SERPL CALC-MCNC: 139 MG/DL (ref ?–100)
NONHDLC SERPL-MCNC: 148 MG/DL (ref ?–130)
TRIGL SERPL-MCNC: 56 MG/DL (ref 30–149)
TSI SER-ACNC: 0.93 MIU/ML (ref 0.36–3.74)
VLDLC SERPL CALC-MCNC: 10 MG/DL (ref 0–30)

## 2023-03-09 PROCEDURE — 84443 ASSAY THYROID STIM HORMONE: CPT | Performed by: FAMILY MEDICINE

## 2023-03-09 PROCEDURE — 80061 LIPID PANEL: CPT | Performed by: FAMILY MEDICINE

## 2023-03-10 ENCOUNTER — TELEPHONE (OUTPATIENT)
Dept: FAMILY MEDICINE CLINIC | Facility: CLINIC | Age: 68
End: 2023-03-10

## 2023-03-14 ENCOUNTER — TELEPHONE (OUTPATIENT)
Dept: FAMILY MEDICINE CLINIC | Facility: CLINIC | Age: 68
End: 2023-03-14

## 2023-03-14 NOTE — TELEPHONE ENCOUNTER
Current Authorization     Status:Approved Auth Start Date:2/8/2023   Payer:[employer] Auth End [id number]   Click accept to begin a new prior authorization

## 2023-05-24 ENCOUNTER — MED REC SCAN ONLY (OUTPATIENT)
Dept: FAMILY MEDICINE CLINIC | Facility: CLINIC | Age: 68
End: 2023-05-24

## 2023-06-12 ENCOUNTER — NURSE ONLY (OUTPATIENT)
Dept: FAMILY MEDICINE CLINIC | Facility: CLINIC | Age: 68
End: 2023-06-12
Payer: MEDICARE

## 2023-06-12 DIAGNOSIS — E78.5 HYPERLIPIDEMIA, UNSPECIFIED HYPERLIPIDEMIA TYPE: ICD-10-CM

## 2023-06-12 LAB
CHOLEST SERPL-MCNC: 193 MG/DL (ref ?–200)
FASTING PATIENT LIPID ANSWER: YES
HDLC SERPL-MCNC: 84 MG/DL (ref 40–59)
LDLC SERPL CALC-MCNC: 99 MG/DL (ref ?–100)
NONHDLC SERPL-MCNC: 109 MG/DL (ref ?–130)
TRIGL SERPL-MCNC: 55 MG/DL (ref 30–149)
VLDLC SERPL CALC-MCNC: 9 MG/DL (ref 0–30)

## 2023-06-12 PROCEDURE — 80061 LIPID PANEL: CPT | Performed by: FAMILY MEDICINE

## 2023-06-12 NOTE — PROGRESS NOTES
Cherri Khanna present in office for nurse visit. Labs as ordered by Dr. Telma Tse; 22 g, Left AC and green tube     All questions/concerns addressed. Patient left in stable condition.

## 2023-06-19 RX ORDER — EZETIMIBE 10 MG/1
TABLET ORAL
Qty: 90 TABLET | Refills: 0 | Status: SHIPPED | OUTPATIENT
Start: 2023-06-19

## 2023-06-20 NOTE — TELEPHONE ENCOUNTER
Patient's name and  verified   Future Appointments   Date Time Provider Teresa Paris   2023 11:20 AM Roni Lee MD [facility]       Patient notified and verbalized an understanding

## 2023-06-22 ENCOUNTER — MED REC SCAN ONLY (OUTPATIENT)
Dept: FAMILY MEDICINE CLINIC | Facility: CLINIC | Age: 68
End: 2023-06-22

## 2023-07-03 NOTE — TELEPHONE ENCOUNTER
Pt called asking if his future rx for EZETIMIBE 10 MG Oral Tab  can be [facility].  [facility], but [facility]

## 2023-07-03 NOTE — TELEPHONE ENCOUNTER
Please see note below    LOV 03/09/23  Last labs 06/12/23  Last refill on 06/19/23, for #90 tabs, with 0 refills  EZETIMIBE 10 MG Oral Tab     Future Appointments   Date Time Provider Teresa Paris   7/12/2023 11:20 AM Lorin Valenzuela MD [facility]     Order(s) pending, please review. Thank you.   [employer]

## 2023-07-05 RX ORDER — EZETIMIBE 10 MG/1
10 TABLET ORAL DAILY
Qty: 90 TABLET | Refills: 3 | Status: SHIPPED | OUTPATIENT
Start: 2023-07-05

## 2023-07-25 ENCOUNTER — OFFICE VISIT (OUTPATIENT)
Facility: LOCATION | Age: 68
End: 2023-07-25
Payer: MEDICARE

## 2023-07-25 DIAGNOSIS — K63.5 POLYP OF COLON, UNSPECIFIED PART OF COLON, UNSPECIFIED TYPE: Primary | ICD-10-CM

## 2023-07-25 PROCEDURE — 99213 OFFICE O/P EST LOW 20 MIN: CPT | Performed by: SURGERY

## 2023-07-25 RX ORDER — POLYETHYLENE GLYCOL 3350, SODIUM CHLORIDE, SODIUM BICARBONATE, POTASSIUM CHLORIDE 420; 11.2; 5.72; 1.48 G/4L; G/4L; G/4L; G/4L
POWDER, FOR SOLUTION ORAL
Qty: 1 EACH | Refills: 0 | Status: SHIPPED | OUTPATIENT
Start: 2023-07-25

## 2023-07-26 NOTE — H&P
Carlos Manuel Marr is a 80year old male  Patient presents with:  Establish Care: EP - rectal bleeding, cscope consult, last seen in the office - 2/11/21, discomfort, no other symptoms      REFERRED BY    Patient presents with recent complaint of rectal bleeding. Patient states that he had a constipated bowel movement approximately 1 week ago followed by approximately 3 days of bright red blood per rectum noted on the toilet paper. He denies clots. He states the bleeding has ceased at this point. Patient has undergone partial nephrectomy since my last evaluation with him. He denies any change in his medical history he denies chest pain or chest pressure. Patient underwent colonoscopy by me in 2021 with adenomatous polyps found       .            Past Medical History:   Diagnosis Date    Abnormal Heart Score CT 08/2019    281.3    Adenomatous colon polyp     Anesthesia complication     woke up during propofol > 20 yrs ago- during [facility]    Arthritis of facet joint of lumbar spine     Calculus of kidney     right  kidney > 10 years ago    Cancer [facility])     multiple myeloma- MGUS ? 2018    Difficult intubation     see airway status explanation    Diverticulosis of colon (without mention of hemorrhage)     Exposure to medical diagnostic radiation     completed in 2017    Facet arthritis of cervical region     Hernia, umbilical      s/p repair    High blood pressure     High cholesterol     History of prostate cancer 04/2016    s/p prostatectomy    History of SCC (squamous cell carcinoma) of skin 2012     Forehead    Hx of motion sickness     on a boat    Left thyroid nodule 2019     Biopsied 2019 per Dr. Feli Del Castillo    MGUS (monoclonal gammopathy of unknown significance)     Dr. Jos Soriano    Muscle weakness     right lat muscle    Pulmonary nodule, right 08/2019    Incidental finding on CT heart score 2019, resolved on CT chest 2020    Thoracic arthritis 08/2019    Incidental finding on CT heart score    Visual impairment     reading glasses     Past Surgical History:   Procedure Laterality Date    APPENDECTOMY      2012? ARTHROSCOPY, SHOULDER, SURGI Left 02/17/2020    Rotator Cuff repair, Dr. Tiana Brooks      sigmoid, due to diverticular disease-? 2012    COLONOSCOPY N/A 01/16/2019    Procedure: COLONOSCOPY, POSSIBLE BIOPSY, POSSIBLE POLYPECTOMY 75453;  Surgeon: Jose Eduardo Mckeon DO;  Location: [facility]    COLONOSCOPY N/A 03/03/2021    Procedure: COLONOSCOPY, POSSIBLE BIOPSY, POSSIBLE POLYPECTOMY 93347;  Surgeon: Jose Eduardo Mckeon DO;  Location: [place] N/A 03/09/2016    Procedure: Danna Couch BIOPSY,POSSIBLE POLYPECTOMY;  Surgeon: Jose Eduardo Mckeon DO;  Location: [facility]    IR EPIDURAL STERIOD INJECTION      neck    LAPAROSCOPIC CHOLECYSTECTOMY  01/2021     Dr. Alonso Marti  01/2021     Dr. Ace Espinoza, done at time of GB removal    NEPHRECTOMY Left 2022    Partial    OTHER SURGICAL HISTORY      prostatectomy-? [id number]    OTHER SURGICAL HISTORY  11/04/2022    Laparoscopic robotic-assisted excision of left retroperitoneal mass    SPINE SURGERY PROCEDURE UNLISTED      cervical fusion 4-7x2, lumbar fusions x1, laminectomies x3    UMBILICAL HERNIA REPAIR N/A 11/10/2014    Procedure: HERNIA UMBILICAL REPAIR ADULT;  Surgeon: Jsoe Eduardo Mckeon DO;  Location: [facility] MAIN OR    VASECTOMY  1986     ezetimibe 10 MG Oral Tab, Take 1 tablet (10 mg total) by mouth daily. , Disp: 90 tablet, Rfl: 3  lisinopril-hydroCHLOROthiazide 20-12.5 MG Oral Tab, Take 1 tablet by mouth every morning., Disp: 90 tablet, Rfl: 1  diphenhydrAMINE-APAP, sleep, (TYLENOL PM EXTRA STRENGTH)  MG Oral Tab, Take 1 tablet by mouth nightly., Disp: , Rfl:   Sildenafil Citrate 25 MG Oral Tab, Take 1 tablet (25 mg total) by mouth daily as needed for Erectile Dysfunction. 1-4 tabs qd prn, Disp: , Rfl: 0    No current facility-administered medications on file prior to visit.     Flakita  Family History Problem Relation Age of Onset    Hypertension Father     Cancer Mother         Breast/CA breast    Heart Disorder Mother     Breast Cancer Mother     Diabetes Mother     Other (Colon cancer) Mother     Breast Cancer Sister     Cancer Sister         CA breast    Cancer Paternal Grandmother         Colon cancer     Social History     Socioeconomic History    Marital status:    Tobacco Use    Smoking status: Former     Packs/day: 0.50     Years: 4.00     Pack years: 2.00     Types: Cigarettes     Quit date: 9/2/2012     Years since quitting: 10.9     Passive exposure: Never    Smokeless tobacco: Never    Tobacco comments:     social   Vaping Use    Vaping Use: Never used   Substance and Sexual Activity    Alcohol use: Yes     Alcohol/week: 14.0 standard drinks of alcohol     Types: 7 Glasses of wine, 7 Standard drinks or equivalent per week     Comment: 1 glass of wine with dinner daily, and a cocktail before bed    Drug use: Yes     Types: Cannabis     Comment: 1/2 CBD gummies- for sleep as needed    Sexual activity: Yes     Partners: Female       ROS     GENERAL HEALTH: otherwise feels well, no weight loss, no fever or chills  SKIN: denies any unusual skin rashes or jaundice  HEENT: denies nasal congestion, sinus pain or sore throat; hearing loss negative,   EYES , no diplopia or vision changes  RESPIRATORY: denies shortness of breath, wheezing or cough   CARDIOVASCULAR: denies chest pain or [place]; no palpitations   GI: denies nausea, vomiting, constipation, diarrhea; no rectal bleeding; no heartburn  GENITAL/: no dysuria, urgency or frequency, no tea colored urine  MUSCULOSKELETAL: no joint complaints upper or lower extremities  HEMATOLOGY: denies hx anemia; denies bruising or excessive bleeding  Endocrine: no weight gain or loss no hot or cold intolerance    EXAM     There were no vitals taken for this visit. GENERAL: well developed, well nourished male, in no apparent distress.     MENTAL STATUS :Alert, oriented x 3  PSYCH: normal mood and affect  SKIN: anicteric, no rashes, no bruising  EYES: PERRLA, EOMI, sclera anicteric,  conjunctiva without pallor  HEENT: normocephalic, atraumatic, TMs clear, nares patent, mouth moist, pharynx w/o erythema  NECK: supple, no JVD, no adenopathy, no thyromegaly  RESPIRATORY: clear to auscultation, no rales , rhonchi or wheezing  CARDIOVASCULAR: RRR, murmur negative  ABDOMEN: normal active BS, soft, nondistended  no HSM, no masses or hernias  LYMPHATIC: no axillary , supraclavicular or inguinal lymphadenopathy  EXTREMITIES: no cyanosis, clubbing or edema, no atrophy, full ROM  Digital rectal examination demonstrates no masses. Patient has some minor excoriation at the anterior midline position suggestive of a healing anal fissure. STUDIES:     Nurse Only on 06/12/2023   Component Date Value Ref Range Status    Cholesterol, Total 06/12/2023 193  <200 mg/dL Final    Desirable  <200 mg/dL  Borderline  200-239 mg/dL  High      >=240 mg/dL        HDL Cholesterol 06/12/2023 84 (H)  40 - 59 mg/dL Final    Interpretive Information:   An HDL cholesterol <40 mg/dL is low and constitutes a coronary heart disease risk factor. An HDL cholesterol >60 mg/dL is a negative risk factor for coronary heart disease.         Triglycerides 06/12/2023 55  30 - 149 mg/dL Final    Reference interval for fasting triglycerides  Desirable: <150 mg/dL  Borderline: 150-199 mg/dL  High: 200-499 mg/dL  Very High: >=500 mg/dL          LDL Cholesterol 06/12/2023 99  <100 mg/dL Final    Desirable <100 mg/dL   Borderline 100-129 mg/dL   High     >=130mg/dL        VLDL 06/12/2023 9  0 - 30 mg/dL Final    Non HDL Chol 06/12/2023 109  <130 mg/dL Final    Desirable  <130 mg/dL   Borderline  130-159 mg/dL   High        160-189 mg/dL       Very high >=190 mg/dL        Patient Fasting for Lipid? 06/12/2023 Yes   Final       ASSESSMENT   Imp: New onset rectal bleeding and patient with history of adenomatous polyps  PLan: Colonoscopy now discussed with patient risk of bleeding and bowel perforation with surgery to repair      Meds & Refills for this Visit:  Requested Prescriptions     Signed Prescriptions Disp Refills    PEG 3350-KCl-Na Bicarb-NaCl (TRILYTE) 420 g Oral Recon Soln 1 each 0     Sig: Starting at 4:00 pm the night before procedure, drink 8 ounces of the prep every 15-20 minutes until finished       Imaging & Consults:  None

## 2023-10-16 RX ORDER — LISINOPRIL AND HYDROCHLOROTHIAZIDE 20; 12.5 MG/1; MG/1
1 TABLET ORAL EVERY MORNING
Qty: 90 TABLET | Refills: 0 | Status: SHIPPED | OUTPATIENT
Start: 2023-10-16

## 2023-10-16 NOTE — TELEPHONE ENCOUNTER
Please let patient or caregiver know or leave message that refill request for the medication/s listed below has/have come from the pharmacy. The refills have been sent. It appears the patient is due for a yearly Medicare physical and fasting labs. Please help schedule this in the next month or so.   Thanks      Requested Prescriptions     Signed Prescriptions Disp Refills    lisinopril-hydroCHLOROthiazide 20-12.5 MG Oral Tab 90 tablet 0     Sig: TAKE 1 TABLET EVERY MORNING     Authorizing Provider: Nona Mcnamara

## 2023-10-26 ENCOUNTER — TELEPHONE (OUTPATIENT)
Dept: FAMILY MEDICINE CLINIC | Facility: CLINIC | Age: 68
End: 2023-10-26

## 2023-10-26 NOTE — TELEPHONE ENCOUNTER
Patient's name and  verified   It was Erwin Rosa M  Patient notified and verbalized an understanding

## 2023-10-26 NOTE — TELEPHONE ENCOUNTER
I assume that it is [employer] and not [employer]. If [employer] then it is fine  If it is [employer], then I have no clue what that is and can not give an opinion on it.   Thanks

## 2023-12-26 RX ORDER — RIBOFLAVIN (VITAMIN B2) 100 MG
100 TABLET ORAL AS NEEDED
COMMUNITY

## 2023-12-26 RX ORDER — MULTIVITAMIN WITH IRON
50 TABLET ORAL DAILY
COMMUNITY

## 2023-12-27 ENCOUNTER — TELEPHONE (OUTPATIENT)
Dept: FAMILY MEDICINE CLINIC | Facility: CLINIC | Age: 68
End: 2023-12-27

## 2023-12-27 DIAGNOSIS — R06.83 SNORING: Primary | ICD-10-CM

## 2023-12-27 NOTE — TELEPHONE ENCOUNTER
Received paperwork from pre- admission testing about patient pre- operative screening questions appears that patient to be a risk for Sleep Apnea     Paperwork sent to scan     Patient is needed to consult with Dr Avila Jones.   Pended referral, sign if appropriate

## 2023-12-27 NOTE — TELEPHONE ENCOUNTER
Patient's name and  verified   Future Appointments   Date Time Provider Teresa Paris   2024 10:20 AM Kristopher Corrigan MD [facility]       Patient notified and verbalized an understanding

## 2023-12-28 ENCOUNTER — MED REC SCAN ONLY (OUTPATIENT)
Dept: FAMILY MEDICINE CLINIC | Facility: CLINIC | Age: 68
End: 2023-12-28

## 2024-01-15 RX ORDER — LISINOPRIL AND HYDROCHLOROTHIAZIDE 20; 12.5 MG/1; MG/1
1 TABLET ORAL EVERY MORNING
Qty: 90 TABLET | Refills: 0 | Status: SHIPPED | OUTPATIENT
Start: 2024-01-15

## 2024-01-18 ENCOUNTER — OFFICE VISIT (OUTPATIENT)
Dept: FAMILY MEDICINE CLINIC | Facility: CLINIC | Age: 69
End: 2024-01-18
Payer: MEDICARE

## 2024-01-18 VITALS
WEIGHT: 225 LBS | TEMPERATURE: 98 F | RESPIRATION RATE: 16 BRPM | OXYGEN SATURATION: 98 % | DIASTOLIC BLOOD PRESSURE: 85 MMHG | HEART RATE: 64 BPM | SYSTOLIC BLOOD PRESSURE: 175 MMHG | BODY MASS INDEX: 36 KG/M2

## 2024-01-18 DIAGNOSIS — Z00.00 MEDICARE ANNUAL WELLNESS VISIT, SUBSEQUENT: Primary | ICD-10-CM

## 2024-01-18 DIAGNOSIS — J02.9 SORE THROAT: ICD-10-CM

## 2024-01-18 DIAGNOSIS — D47.2 MGUS (MONOCLONAL GAMMOPATHY OF UNKNOWN SIGNIFICANCE): ICD-10-CM

## 2024-01-18 DIAGNOSIS — R93.1 ABNORMAL HEART SCORE CT: ICD-10-CM

## 2024-01-18 DIAGNOSIS — R60.0 SWELLING OF BOTH PAROTID GLANDS: ICD-10-CM

## 2024-01-18 DIAGNOSIS — I10 HYPERTENSION, UNSPECIFIED TYPE: ICD-10-CM

## 2024-01-18 DIAGNOSIS — E78.49 OTHER HYPERLIPIDEMIA: ICD-10-CM

## 2024-01-18 DIAGNOSIS — E04.1 THYROID NODULE: ICD-10-CM

## 2024-01-18 PROCEDURE — 1160F RVW MEDS BY RX/DR IN RCRD: CPT | Performed by: FAMILY MEDICINE

## 2024-01-18 PROCEDURE — 3077F SYST BP >= 140 MM HG: CPT | Performed by: FAMILY MEDICINE

## 2024-01-18 PROCEDURE — 96160 PT-FOCUSED HLTH RISK ASSMT: CPT | Performed by: FAMILY MEDICINE

## 2024-01-18 PROCEDURE — 84443 ASSAY THYROID STIM HORMONE: CPT | Performed by: FAMILY MEDICINE

## 2024-01-18 PROCEDURE — 3079F DIAST BP 80-89 MM HG: CPT | Performed by: FAMILY MEDICINE

## 2024-01-18 PROCEDURE — 80061 LIPID PANEL: CPT | Performed by: FAMILY MEDICINE

## 2024-01-18 PROCEDURE — 1159F MED LIST DOCD IN RCRD: CPT | Performed by: FAMILY MEDICINE

## 2024-01-18 PROCEDURE — 1125F AMNT PAIN NOTED PAIN PRSNT: CPT | Performed by: FAMILY MEDICINE

## 2024-01-18 PROCEDURE — G0439 PPPS, SUBSEQ VISIT: HCPCS | Performed by: FAMILY MEDICINE

## 2024-01-18 PROCEDURE — 1170F FXNL STATUS ASSESSED: CPT | Performed by: FAMILY MEDICINE

## 2024-01-18 RX ORDER — OLMESARTAN MEDOXOMIL AND HYDROCHLOROTHIAZIDE 40/12.5 40; 12.5 MG/1; MG/1
1 TABLET ORAL DAILY
Qty: 90 TABLET | Refills: 0 | Status: SHIPPED | OUTPATIENT
Start: 2024-01-18 | End: 2024-01-19

## 2024-01-18 NOTE — PROGRESS NOTES
Calvin May is a 68 year old male.    CC:    Chief Complaint   Patient presents with    Physical     MAW       HPI:  Yearly PX    Last PSA: Followed by Urology  Ref Range & Units 4/28/23 11:10 AM   PSA  0.01 - 4 ng/mL <0.014     Last lipid:  Lab Results   Component Value Date    CHOLEST 193 06/12/2023    TRIG 55 06/12/2023    HDL 84 (H) 06/12/2023    LDL 99 06/12/2023    VLDL 9 06/12/2023    TCHDLRATIO 2.34 11/29/2016    NONHDLC 109 06/12/2023       Last Colon Cancer screening: Colonoscopy 03/03/2021 to be repeated 3/2024      General Health     In the past six months, have you lost more than 10 pounds without trying?: 2 - No    Has your appetite been poor?: No    Type of Diet: Balanced    How does the patient maintain a good energy level?: Appropriate Exercise    How would you describe your daily physical activity?: Moderate    How would you describe your current health state?: Good    How do you maintain positive mental well-being?: Social Interaction;Visiting Friends;Visiting Family         Have you had any immunizations at another office such as Influenza, Hepatitis B, Tetanus, or Pneumococcal?: Yes     Functional Ability     Bathing or Showering: Able without help    Toileting: Able without help    Dressing: Able without help    Eating: Able without help    Driving: Able without help    Preparing your meals: Able without help    Managing money/bills: Able without help    Taking medications as prescribed: Able without help    Are you able to afford your medications?: Yes    Hearing Problems?: No     Functional Status     Hearing Problems?: No    Vision Problems? : No    Difficulty walking?: No    Difficulty dressing or bathing?: No    Problems with daily activities? : No    Memory Problems?: No      Fall Screen: See Flow sheet  Fall Risk Assessment:   He has been screened for Falls and is low risk.        PHQ-2 SCORE: 0, done 1/18/2024           Advance Directives     Do you have a healthcare power  of ?: No    Do you have a living will?: No     Hearing Assessment (Required for AWV/SWV)      Hearing Screening    Time taken: 1/18/2024 10:09 AM  Screening Method: Finger Rub  Finger Rub Result: Pass               Visual Acuity     Right Eye Visual Acuity: Uncorrected Left Eye Visual Acuity: Corrected   Right Eye Chart Acuity: 20/20 Left Eye Chart Acuity: 20/20     Cognitive Assessment     What day of the week is this?: Correct    What month is it?: Correct    What year is it?: Correct    Recall \"Ball\": Correct    Recall \"Flag\": Correct    Recall \"Tree\": Correct        Patient Active Problem List   Diagnosis    Hypertension: taking BP med as directed. BP done sporadically at home and has been < 140/90    MGUS (monoclonal gammopathy of unknown significance): followed by Heme/Onc    Other hyperlipidemia: Zetia, due for labs      He has known thyroid nodule. Recent MRI cervical spine by Spine Surgery revealed the nodule again. It has been a few years since Noe has seen the ENT. The nodule was bx'd circa 2019.   Noe makes mention he feels pressure/swelling in the law area and has noted a sore throat for one month.    Immunization History   Administered Date(s) Administered    Covid-19 Vaccine Pfizer 30 mcg/0.3 ml 03/06/2021, 03/27/2021, 10/03/2021    Covid-19 Vaccine Pfizer Bivalent 30mcg/0.3mL 09/26/2022    Covid-19 Vaccine Pfizer Maged-Sucrose 30 mcg/0.3 ml 03/31/2022    FLU VAC High Dose 65 YRS & Older PRSV Free (77839) 11/19/2021, 09/26/2022    FLULAVAL 6 months & older 0.5 ml Prefilled syringe (27609) 12/07/2018, 01/23/2020, 12/18/2020    HIGH DOSE FLU 65 YRS AND OLDER PRSV FREE SINGLE D (38089) FLU CLINIC 11/19/2021    Influenza 11/10/2016    Pneumococcal (Prevnar 13) 12/07/2018    Pneumovax 23 12/18/2020    TD 11/09/2016        Allergies:  No Known Allergies   Current Meds:  Current Outpatient Medications   Medication Sig Dispense Refill    lisinopril-hydroCHLOROthiazide 20-12.5 MG Oral Tab Take 1  tablet by mouth every morning. 90 tablet 0    Ascorbic Acid (VITAMIN C) 100 MG Oral Tab Take 1 tablet (100 mg total) by mouth as needed.      vitamin B-12 50 MCG Oral Tab Take 1 tablet (50 mcg total) by mouth daily.      ezetimibe 10 MG Oral Tab Take 1 tablet (10 mg total) by mouth daily. 90 tablet 3    diphenhydrAMINE-APAP, sleep, (TYLENOL PM EXTRA STRENGTH)  MG Oral Tab Take 1 tablet by mouth nightly.      Sildenafil Citrate 25 MG Oral Tab Take 1 tablet (25 mg total) by mouth daily as needed for Erectile Dysfunction. 1-4 tabs qd prn  0        History:  Past Medical History:   Diagnosis Date    Abnormal Heart Score CT 08/2019    281.3    Adenomatous colon polyp     Anesthesia complication     woke up during propofol > 20 yrs ago- during [facility]    Arthritis of facet joint of lumbar spine     Calculus of kidney     right  kidney > 10 years ago    Difficult intubation     see airway status explanation    Diverticulosis of colon (without mention of hemorrhage)     Exposure to medical diagnostic radiation     completed in 2017    Facet arthritis of cervical region     Hernia, umbilical      s/p repair    History of prostate cancer 04/2016    s/p prostatectomy    History of SCC (squamous cell carcinoma) of skin 2012     Forehead    Hx of motion sickness     on a boat    Hyperlipidemia     Hypertension     Left thyroid nodule 2019     Biopsied 2019 per Dr. Yen    MGUS (monoclonal gammopathy of unknown significance)     Dr. Dan    Muscle weakness     right lat muscle    Pulmonary nodule, right 08/2019    Incidental finding on CT heart score 2019, resolved on CT chest 2020    Thoracic arthritis 08/2019    Incidental finding on CT heart score    Visual impairment     reading glasses      Past Surgical History:   Procedure Laterality Date    APPENDECTOMY      2012?    ARTHROSCOPY, SHOULDER, SURGI Left 02/17/2020    Rotator Cuff repair, Dr. Castelan    CHOLECYSTECTOMY  2020    COLECTOMY      sigmoid, due to  diverticular disease-?     COLONOSCOPY N/A 2019    Procedure: COLONOSCOPY, POSSIBLE BIOPSY, POSSIBLE POLYPECTOMY 85170;  Surgeon: Roshan Horowitz DO;  Location: [facility]    COLONOSCOPY N/A 2021    Procedure: COLONOSCOPY, POSSIBLE BIOPSY, POSSIBLE POLYPECTOMY 53830;  Surgeon: Roshan Horowitz DO;  Location: [facility]    COLONOSCOPY,DIAGNOSTIC N/A 2016    Procedure: COLONOSCOPY,POSSIBLE BIOPSY,POSSIBLE POLYPECTOMY;  Surgeon: Roshan Horowitz DO;  Location: [facility]    IR EPIDURAL STERIOD INJECTION      neck    LAPAROSCOPIC CHOLECYSTECTOMY  2021     Dr. Horowitz    LYSIS OF ADHESIONS  2021     Dr. Horowitz, done at time of GB removal    NEPHRECTOMY Left     Partial    OTHER SURGICAL HISTORY      prostatectomy-? [id number]    OTHER SURGICAL HISTORY  2022    Laparoscopic robotic-assisted excision of left retroperitoneal mass    SPINE SURGERY PROCEDURE UNLISTED      cervical fusion 4-7x2, lumbar fusions x1, laminectomies x3    UMBILICAL HERNIA REPAIR N/A 11/10/2014    Procedure: HERNIA UMBILICAL REPAIR ADULT;  Surgeon: Roshan Horowitz DO;  Location: [facility]IN OR    VASECTOMY        Family History   Problem Relation Age of Onset    Hypertension Father     Cancer Mother         Breast/CA breast    Heart Disorder Mother     Breast Cancer Mother     Diabetes Mother     Other (Colon cancer) Mother     Breast Cancer Sister     Cancer Sister         CA breast    Cancer Paternal Grandmother         Colon cancer      Family Status   Relation Status    Fa  at age 76        Heart disease    Mo Alive    Sis (Not Specified)    PGMA (Not Specified)      Social History     Socioeconomic History    Marital status:    Tobacco Use    Smoking status: Former     Packs/day: 0.50     Years: 4.00     Additional pack years: 0.00     Total pack years: 2.00     Types: Cigarettes     Quit date: 2012     Years since quittin.3     Passive exposure: Never    Smokeless tobacco:  Never    Tobacco comments:     social   Vaping Use    Vaping Use: Never used   Substance and Sexual Activity    Alcohol use: Yes     Alcohol/week: 14.0 standard drinks of alcohol     Types: 7 Glasses of wine, 7 Standard drinks or equivalent per week     Comment: 1 glass of wine with dinner daily, and a cocktail before bed    Drug use: Not Currently     Types: Cannabis     Comment: 1/2 CBD gummies- for sleep as needed    Sexual activity: Yes     Partners: Female        ROS:  General: energy level stable  Cardiovascular/Pulses: Noted chest pains when he has flares of neck arthritis pain    Vitals: BP (!) 175/85 (BP Location: Right arm)   Pulse 64   Temp 97.5 °F (36.4 °C)   Resp 16   Wt 225 lb (102.1 kg)   SpO2 98%   BMI 36.04 kg/m²     Reviewed by AMERICA Rodriguez M.D.      Physical Exam:  GEN: well developed, well nourished, in no apparent distress  EYE: B conjunctiva and lids normal  HENT: normocephalic; normal nose, pharynx and TM's, B parotid glands slightly enlarged to appearance and palpation  NECK: No lymphadenopathy, thyromegaly or masses  CAR: S1, S2 normal, RRR; no S3, no S4; no click; murmur negative  PULM: clear to auscultation B, no accessory muscle use  GI: normal active BS+, soft, nondistended; no HSM; no masses; no bruits; no masses; nontender, no G/R/R   PSYCH: alert and oriented x 3; affect appropriate  SKIN: not examined  BREAST: not examined/not applicable  EXTREMITIES: No clubbing, cyanosis or edema  RECTAL: not examined  GENITAL: not examined  LYMPH: no supraclavicular nodes  MUSCULOSKELETAL: normal ambulation  NEURO: Awake and alert. Normal speech and articulation. No facial droop or asymmetry. Moving all extremities equally. Symmetric B patellar DTRs    ASSESSMENT AND PLAN    1. Medicare annual wellness visit, subsequent  CBC and CMP done in the past 6 months with Hematology  PSA followed by Urology  Has colonoscopy scheduled for next week      - TSH W Reflex To Free T4  - Lipid Panel    2.  Hypertension, unspecified type  Stop Lisinopril/hydrochlorothiazide.  He is to [facility] BP readings in 5 days on the new BP med.    - CT CALCIUM SCORING; Future  - Olmesartan Medoxomil-HCTZ 40-12.5 MG Oral Tab; Take 1 tablet by mouth daily.  Dispense: 90 tablet; Refill: 0    3. Other hyperlipidemia  Await results   - Lipid Panel; Future  - Lipid Panel    4. Abnormal Heart Score CT  Await results   - CT CALCIUM SCORING; Future    5. MGUS (monoclonal gammopathy of unknown significance)  Care per Hematology/Oncology    6. Thyroid nodule  I have asked that he consult with his ENT as it has been a few years    7. Swelling of both parotid glands  Consult ENT     8. Sore throat  No findings on exam   Consult ENT for better evaluation of the throat       No follow-ups on file.    Orders for this visit:    Orders Placed This Encounter   Procedures    TSH W Reflex To Free T4     Standing Status:   Future     Number of Occurrences:   1     Standing Expiration Date:   1/18/2025    Lipid Panel     Standing Status:   Future     Number of Occurrences:   1     Standing Expiration Date:   1/18/2025    VENIPUNCTURE     Order Specific Question:   Release to patient     Answer:   Immediate       CT CALCIUM SCORING    Meds & Refills for this Visit:  Requested Prescriptions     Signed Prescriptions Disp Refills    Olmesartan Medoxomil-HCTZ 40-12.5 MG Oral Tab 90 tablet 0     Sig: Take 1 tablet by mouth daily.             Authorized by Erwin Rodriguez M.D.

## 2024-01-19 ENCOUNTER — TELEPHONE (OUTPATIENT)
Dept: FAMILY MEDICINE CLINIC | Facility: CLINIC | Age: 69
End: 2024-01-19

## 2024-01-19 DIAGNOSIS — I10 HYPERTENSION, UNSPECIFIED TYPE: ICD-10-CM

## 2024-01-19 LAB
CHOLEST SERPL-MCNC: 209 MG/DL (ref ?–200)
FASTING PATIENT LIPID ANSWER: YES
HDLC SERPL-MCNC: 83 MG/DL (ref 40–59)
LDLC SERPL CALC-MCNC: 116 MG/DL (ref ?–100)
NONHDLC SERPL-MCNC: 126 MG/DL (ref ?–130)
TRIGL SERPL-MCNC: 54 MG/DL (ref 30–149)
TSI SER-ACNC: 1.13 MIU/ML (ref 0.36–3.74)
VLDLC SERPL CALC-MCNC: 9 MG/DL (ref 0–30)

## 2024-01-19 RX ORDER — OLMESARTAN MEDOXOMIL AND HYDROCHLOROTHIAZIDE 40/12.5 40; 12.5 MG/1; MG/1
1 TABLET ORAL DAILY
Qty: 90 TABLET | Refills: 3 | Status: SHIPPED | OUTPATIENT
Start: 2024-01-19

## 2024-01-19 NOTE — TELEPHONE ENCOUNTER
Left message detail message per (HIPPA form) with recommendation. Patient to call back if they have any questions

## 2024-01-19 NOTE — TELEPHONE ENCOUNTER
Good to hear it is coming down.  Refill sent  Please have him send me a BP reading next week in [employer].   Thanks

## 2024-01-19 NOTE — TELEPHONE ENCOUNTER
Pt reports bp reading of 144/70 - Down from yesterday    New bp meds 90-day supply, refill need [facility] for future refills.     Please [facility], thank you!

## 2024-01-24 ENCOUNTER — ANESTHESIA EVENT (OUTPATIENT)
Dept: ENDOSCOPY | Facility: HOSPITAL | Age: 69
End: 2024-01-24
Payer: MEDICARE

## 2024-01-24 ENCOUNTER — ANESTHESIA (OUTPATIENT)
Dept: ENDOSCOPY | Facility: HOSPITAL | Age: 69
End: 2024-01-24
Payer: MEDICARE

## 2024-01-24 ENCOUNTER — HOSPITAL ENCOUNTER (OUTPATIENT)
Facility: HOSPITAL | Age: 69
Setting detail: HOSPITAL OUTPATIENT SURGERY
Discharge: HOME | End: 2024-01-24
Attending: SURGERY | Admitting: SURGERY
Payer: MEDICARE

## 2024-01-24 VITALS
HEART RATE: 74 BPM | OXYGEN SATURATION: 98 % | SYSTOLIC BLOOD PRESSURE: 139 MMHG | BODY MASS INDEX: 35.52 KG/M2 | DIASTOLIC BLOOD PRESSURE: 72 MMHG | WEIGHT: 221 LBS | TEMPERATURE: 98 F | HEIGHT: 66.25 IN | RESPIRATION RATE: 18 BRPM

## 2024-01-24 DIAGNOSIS — K63.5 POLYP OF COLON, UNSPECIFIED PART OF COLON, UNSPECIFIED TYPE: ICD-10-CM

## 2024-01-24 PROCEDURE — 88305 TISSUE EXAM BY PATHOLOGIST: CPT | Performed by: SURGERY

## 2024-01-24 PROCEDURE — 0DBK8ZX EXCISION OF ASCENDING COLON, VIA NATURAL OR ARTIFICIAL OPENING ENDOSCOPIC, DIAGNOSTIC: ICD-10-PCS | Performed by: SURGERY

## 2024-01-24 RX ORDER — SODIUM CHLORIDE, SODIUM LACTATE, POTASSIUM CHLORIDE, CALCIUM CHLORIDE 600; 310; 30; 20 MG/100ML; MG/100ML; MG/100ML; MG/100ML
INJECTION, SOLUTION INTRAVENOUS CONTINUOUS
Status: DISCONTINUED | OUTPATIENT
Start: 2024-01-24 | End: 2024-01-24

## 2024-01-24 RX ORDER — LIDOCAINE HYDROCHLORIDE 10 MG/ML
INJECTION, SOLUTION EPIDURAL; INFILTRATION; INTRACAUDAL; PERINEURAL AS NEEDED
Status: DISCONTINUED | OUTPATIENT
Start: 2024-01-24 | End: 2024-01-24 | Stop reason: SURG

## 2024-01-24 RX ADMIN — LIDOCAINE HYDROCHLORIDE 50 MG: 10 INJECTION, SOLUTION EPIDURAL; INFILTRATION; INTRACAUDAL; PERINEURAL at 07:49:00

## 2024-01-24 RX ADMIN — SODIUM CHLORIDE, SODIUM LACTATE, POTASSIUM CHLORIDE, CALCIUM CHLORIDE: 600; 310; 30; 20 INJECTION, SOLUTION INTRAVENOUS at 07:46:00

## 2024-01-24 RX ADMIN — SODIUM CHLORIDE, SODIUM LACTATE, POTASSIUM CHLORIDE, CALCIUM CHLORIDE: 600; 310; 30; 20 INJECTION, SOLUTION INTRAVENOUS at 08:04:00

## 2024-01-24 NOTE — BRIEF OP NOTE
Pre-Operative Diagnosis: Polyp of colon, unspecified part of colon, unspecified type [K63.5]     Post-Operative Diagnosis: COLON POLYPS      Procedure Performed:   COLONOSCOPY with HOT SNARE POLYPECTOMY    Surgeon(s) and Role:     * Roshan Horowitz DO - Primary    Assistant(s):        Surgical Findings:      Specimen:      Estimated Blood Loss: No data recorded    Dictation Number:      Roshan Horowitz DO  1/24/2024  8:22 AM

## 2024-01-24 NOTE — ANESTHESIA POSTPROCEDURE EVALUATION
[facility]    Calvin May Patient Status:  Hospital Outpatient Surgery   Age/Gender 68 year old male MRN [id number]   Location [facility] END[facility] Attending Roshan Horowitz DO   Hosp Day # 0 PCP Erwin Rodriguez MD       Anesthesia Post-op Note    COLONOSCOPY with HOT SNARE POLYPECTOMY    Procedure Summary       Date: 01/24/24 Room / Location:  ENDOSCOPY 04 / [facility]    Anesthesia Start: 0746 Anesthesia Stop:     Procedure: COLONOSCOPY with HOT SNARE POLYPECTOMY Diagnosis:       Polyp of colon, unspecified part of colon, unspecified type      (COLON POLYPS)    Surgeons: Roshan Horowitz DO Anesthesiologist: Grant Dorsey MD    Anesthesia Type: MAC ASA Status: 2            Anesthesia Type: MAC    Vitals Value Taken Time   /72 01/24/24 0810   Temp available 01/24/24 0810   Pulse 72 01/24/24 0810   Resp 16 01/24/24 0810   SpO2 97% 01/24/24 0810       Patient Location: Endoscopy    Anesthesia Type: MAC    Airway Patency: patent    Postop Pain Control: adequate    Mental Status: mildly sedated but able to meaningfully participate in the post-anesthesia evaluation    Nausea/Vomiting: none    Cardiopulmonary/Hydration status: stable euvolemic    Complications: no apparent anesthesia related complications    Postop vital signs: stable    Dental Exam: Unchanged from Preop    Patient to be discharged home when criteria met.

## 2024-01-24 NOTE — H&P
Patient presents with recent complaint of rectal bleeding.  Patient states that he had a constipated bowel movement approximately 1 week ago followed by approximately 3 days of bright red blood per rectum noted on the toilet paper.  He denies clots.  He states the bleeding has ceased at this point.  Patient has undergone partial nephrectomy since my last evaluation with him.  He denies any change in his medical history he denies chest pain or chest pressure.  Patient underwent colonoscopy by me in 2021 with adenomatous polyps found         .                    Past Medical History:   Diagnosis Date    Abnormal Heart Score CT 08/2019     281.3    Adenomatous colon polyp      Anesthesia complication       woke up during propofol > 20 yrs ago- during [facility]    Arthritis of facet joint of lumbar spine      Calculus of kidney       right  kidney > 10 years ago    Cancer (HCC)       multiple myeloma- MGUS ? 2018    Difficult intubation       see airway status explanation    Diverticulosis of colon (without mention of hemorrhage)      Exposure to medical diagnostic radiation       completed in 2017    Facet arthritis of cervical region      Hernia, umbilical        s/p repair    High blood pressure      High cholesterol      History of prostate cancer 04/2016     s/p prostatectomy    History of SCC (squamous cell carcinoma) of skin 2012      Forehead    Hx of motion sickness       on a boat    Left thyroid nodule 2019      Biopsied 2019 per Dr. Yen    MGUS (monoclonal gammopathy of unknown significance)       Dr. Dan    Muscle weakness       right lat muscle    Pulmonary nodule, right 08/2019     Incidental finding on CT heart score 2019, resolved on CT chest 2020    Thoracic arthritis 08/2019     Incidental finding on CT heart score    Visual impairment       reading glasses            Past Surgical History:   Procedure Laterality Date    APPENDECTOMY         2012?    ARTHROSCOPY, SHOULDER, SURGI Left 02/17/2020      Rotator Cuff repair, Dr. Castelan    COLECTOMY         sigmoid, due to diverticular disease-? 2012    COLONOSCOPY N/A 01/16/2019     Procedure: COLONOSCOPY, POSSIBLE BIOPSY, POSSIBLE POLYPECTOMY 96813;  Surgeon: Roshan Horowitz DO;  Location: [facility]    COLONOSCOPY N/A 03/03/2021     Procedure: COLONOSCOPY, POSSIBLE BIOPSY, POSSIBLE POLYPECTOMY 24109;  Surgeon: Roshan Horowitz DO;  Location: [facility]    COLONOSCOPY,DIAGNOSTIC N/A 03/09/2016     Procedure: COLONOSCOPY,POSSIBLE BIOPSY,POSSIBLE POLYPECTOMY;  Surgeon: Roshan Horowitz DO;  Location: [facility]    IR EPIDURAL STERIOD INJECTION         neck    LAPAROSCOPIC CHOLECYSTECTOMY   01/2021      Dr. Horowitz    LYSIS OF ADHESIONS   01/2021      Dr. Horowitz, done at time of GB removal    NEPHRECTOMY Left 2022     Partial    OTHER SURGICAL HISTORY         prostatectomy-? [id number]    OTHER SURGICAL HISTORY   11/04/2022     Laparoscopic robotic-assisted excision of left retroperitoneal mass    SPINE SURGERY PROCEDURE UNLISTED         cervical fusion 4-7x2, lumbar fusions x1, laminectomies x3    UMBILICAL HERNIA REPAIR N/A 11/10/2014     Procedure: HERNIA UMBILICAL REPAIR ADULT;  Surgeon: Roshan Horowitz DO;  Location: [facility]    VASECTOMY   1986      ezetimibe 10 MG Oral Tab, Take 1 tablet (10 mg total) by mouth daily., Disp: 90 tablet, Rfl: 3  lisinopril-hydroCHLOROthiazide 20-12.5 MG Oral Tab, Take 1 tablet by mouth every morning., Disp: 90 tablet, Rfl: 1  diphenhydrAMINE-APAP, sleep, (TYLENOL PM EXTRA STRENGTH)  MG Oral Tab, Take 1 tablet by mouth nightly., Disp: , Rfl:   Sildenafil Citrate 25 MG Oral Tab, Take 1 tablet (25 mg total) by mouth daily as needed for Erectile Dysfunction. 1-4 tabs qd prn, Disp: , Rfl: 0     No current facility-administered medications on file prior to visit.     @ALL@        Family History   Problem Relation Age of Onset    Hypertension Father      Cancer Mother           Breast/CA breast    Heart Disorder Mother       Breast Cancer Mother      Diabetes Mother      Other (Colon cancer) Mother      Breast Cancer Sister      Cancer Sister           CA breast    Cancer Paternal Grandmother           Colon cancer      Social History            Socioeconomic History    Marital status:    Tobacco Use    Smoking status: Former       Packs/day: 0.50       Years: 4.00       Pack years: 2.00       Types: Cigarettes       Quit date: 9/2/2012       Years since quitting: 10.9       Passive exposure: Never    Smokeless tobacco: Never    Tobacco comments:       social   Vaping Use    Vaping Use: Never used   Substance and Sexual Activity    Alcohol use: Yes       Alcohol/week: 14.0 standard drinks of alcohol       Types: 7 Glasses of wine, 7 Standard drinks or equivalent per week       Comment: 1 glass of wine with dinner daily, and a cocktail before bed    Drug use: Yes       Types: Cannabis       Comment: 1/2 CBD gummies- for sleep as needed    Sexual activity: Yes       Partners: Female         ROS      GENERAL HEALTH: otherwise feels well, no weight loss, no fever or chills  SKIN: denies any unusual skin rashes or jaundice  HEENT: denies nasal congestion, sinus pain or sore throat; hearing loss negative,   EYES , no diplopia or vision changes  RESPIRATORY: denies shortness of breath, wheezing or cough   CARDIOVASCULAR: denies chest pain or HUNTLEY; no palpitations   GI: denies nausea, vomiting, constipation, diarrhea; no rectal bleeding; no heartburn  GENITAL/: no dysuria, urgency or frequency, no tea colored urine  MUSCULOSKELETAL: no joint complaints upper or lower extremities  HEMATOLOGY: denies hx anemia; denies bruising or excessive bleeding  Endocrine: no weight gain or loss no hot or cold intolerance     EXAM      There were no vitals taken for this visit.  GENERAL: well developed, well nourished male, in no apparent distress.    MENTAL STATUS :Alert, oriented x 3  PSYCH: normal mood and affect  SKIN: anicteric, no rashes, no  bruising  EYES: PERRLA, EOMI, sclera anicteric,  conjunctiva without pallor  HEENT: normocephalic, atraumatic, TMs clear, nares patent, mouth moist, pharynx w/o erythema  NECK: supple, no JVD, no adenopathy, no thyromegaly  RESPIRATORY: clear to auscultation, no rales , rhonchi or wheezing  CARDIOVASCULAR: RRR, murmur negative  ABDOMEN: normal active BS, soft, nondistended  no HSM, no masses or hernias  LYMPHATIC: no axillary , supraclavicular or inguinal lymphadenopathy  EXTREMITIES: no cyanosis, clubbing or edema, no atrophy, full ROM  Digital rectal examination demonstrates no masses.  Patient has some minor excoriation at the anterior midline position suggestive of a healing anal fissure.  STUDIES:              Nurse Only on 06/12/2023   Component Date Value Ref Range Status    Cholesterol, Total 06/12/2023 193  <200 mg/dL Final     Desirable  <200 mg/dL  Borderline  200-239 mg/dL  High      >=240 mg/dL          HDL Cholesterol 06/12/2023 84 (H)  40 - 59 mg/dL Final     Interpretive Information:   An HDL cholesterol <40 mg/dL is low and constitutes a coronary heart disease risk factor. An HDL cholesterol >60 mg/dL is a negative risk factor for coronary heart disease.          Triglycerides 06/12/2023 55  30 - 149 mg/dL Final     Reference interval for fasting triglycerides  Desirable: <150 mg/dL  Borderline: 150-199 mg/dL  High: 200-499 mg/dL  Very High: >=500 mg/dL             LDL Cholesterol 06/12/2023 99  <100 mg/dL Final     Desirable <100 mg/dL   Borderline 100-129 mg/dL   High     >=130mg/dL          VLDL 06/12/2023 9  0 - 30 mg/dL Final    Non HDL Chol 06/12/2023 109  <130 mg/dL Final     Desirable  <130 mg/dL   Borderline  130-159 mg/dL   High        160-189 mg/dL       Very high >=190 mg/dL          Patient Fasting for Lipid? 06/12/2023 Yes    Final         ASSESSMENT   Imp: New onset rectal bleeding and patient with history of adenomatous polyps  PLan: Colonoscopy now discussed with patient risk of  bleeding and bowel perforation with surgery to repair

## 2024-01-24 NOTE — DISCHARGE INSTRUCTIONS
Home Care Instructions for Colonoscopy with Sedation    Diet:  - Resume your regular diet   - Start with light meals to minimize bloating.  - Do not drink alcohol today.    Medication:  - If you have questions about resuming your normal medications, please contact your Primary Care Physician.    Activities:  - Take it easy today. Do not return to work today.  - Do not drive today.  - Do not operate any machinery today (including kitchen equipment).    Colonoscopy:  - You may notice some rectal \"spotting\" (a little blood on the toilet tissue) for a day or two after the exam. This is normal.  - If you experience any rectal bleeding (not spotting), persistent tenderness or sharp severe abdominal pains, oral temperature over 100 degrees Fahrenheit, light-headedness or dizziness, or any other problems, contact your doctor.    **If unable to reach your doctor, please go to the [facility] Emergency Room**    - Your referring physician will receive a full report of your examination.  - If you do not hear from your doctor's office within two weeks of your biopsy, please call them for your results.

## 2024-01-24 NOTE — ANESTHESIA PREPROCEDURE EVALUATION
PRE-OP EVALUATION    Patient Name: Calvin May    Admit Diagnosis: Polyp of colon, unspecified part of colon, unspecified type [K63.5]    Pre-op Diagnosis: Polyp of colon, unspecified part of colon, unspecified type [K63.5]    COLONOSCOPY    Anesthesia Procedure: COLONOSCOPY    Surgeon(s) and Role:     * Roshan Horowitz, DO - Primary    Pre-op vitals reviewed.  Temp: 98.2 °F (36.8 °C)  Pulse: 71  Resp: 18  BP: 159/79  SpO2: 98 %  Body mass index is 35.4 kg/m².    Current medications reviewed.  Hospital Medications:  • lactated ringers infusion   Intravenous Continuous       Outpatient Medications:     Medications Prior to Admission   Medication Sig Dispense Refill Last Dose   • Olmesartan Medoxomil-HCTZ 40-12.5 MG Oral Tab Take 1 tablet by mouth daily. 90 tablet 3 1/24/2024   • Ascorbic Acid (VITAMIN C) 100 MG Oral Tab Take 1 tablet (100 mg total) by mouth as needed.   Past Week   • vitamin B-12 50 MCG Oral Tab Take 1 tablet (50 mcg total) by mouth daily.   Past Month   • ezetimibe 10 MG Oral Tab Take 1 tablet (10 mg total) by mouth daily. 90 tablet 3 1/23/2024   • diphenhydrAMINE-APAP, sleep, (TYLENOL PM EXTRA STRENGTH)  MG Oral Tab Take 1 tablet by mouth nightly.   Past Week   • Sildenafil Citrate 25 MG Oral Tab Take 1 tablet (25 mg total) by mouth daily as needed for Erectile Dysfunction. 1-4 tabs qd prn  0 Past Month       Allergies: Patient has no known allergies.      Anesthesia Evaluation    Patient summary reviewed.    Anesthetic Complications  (+) history of anesthetic complications  History of: difficult airway       GI/Hepatic/Renal                                 Cardiovascular                (+) obesity  (+) hypertension   (+) hyperlipidemia                    (-) angina              Endo/Other                                  Pulmonary               (-) shortness of breath            Neuro/Psych                                    Past Surgical History:   Procedure Laterality Date   •  APPENDECTOMY      ?   • ARTHROSCOPY, SHOULDER, SURGI Left 2020    Rotator Cuff repair, Dr. Castelan   • CHOLECYSTECTOMY     • COLECTOMY      sigmoid, due to diverticular disease-?    • COLONOSCOPY N/A 2019    Procedure: COLONOSCOPY, POSSIBLE BIOPSY, POSSIBLE POLYPECTOMY 75912;  Surgeon: Roshan Horowitz DO;  Location: [facility]   • COLONOSCOPY N/A 2021    Procedure: COLONOSCOPY, POSSIBLE BIOPSY, POSSIBLE POLYPECTOMY 98154;  Surgeon: Roshan Horowitz DO;  Location: [facility]   • COLONOSCOPY,DIAGNOSTIC N/A 2016    Procedure: COLONOSCOPY,POSSIBLE BIOPSY,POSSIBLE POLYPECTOMY;  Surgeon: Roshan Horoiwtz DO;  Location: [facility]   • IR EPIDURAL STERIOD INJECTION      neck   • LAPAROSCOPIC CHOLECYSTECTOMY  2021     Dr. Horowitz   • LYSIS OF ADHESIONS  2021     Dr. Horowitz, done at time of GB removal   • NEPHRECTOMY Left     Partial   • OTHER SURGICAL HISTORY      prostatectomy-? [id number]   • OTHER SURGICAL HISTORY  2022    Laparoscopic robotic-assisted excision of left retroperitoneal mass   • SPINE SURGERY PROCEDURE UNLISTED      cervical fusion 4-7x2, lumbar fusions x1, laminectomies x3   • UMBILICAL HERNIA REPAIR N/A 11/10/2014    Procedure: HERNIA UMBILICAL REPAIR ADULT;  Surgeon: Roshan Horowitz DO;  Location:  MAIN OR   • VASECTOMY       Social History     Socioeconomic History   • Marital status:    Tobacco Use   • Smoking status: Former     Packs/day: 0.50     Years: 4.00     Additional pack years: 0.00     Total pack years: 2.00     Types: Cigarettes     Quit date: 2012     Years since quittin.4     Passive exposure: Never   • Smokeless tobacco: Never   • Tobacco comments:     social   Vaping Use   • Vaping Use: Never used   Substance and Sexual Activity   • Alcohol use: Yes     Alcohol/week: 14.0 standard drinks of alcohol     Types: 7 Glasses of wine, 7 Standard drinks or equivalent per week     Comment: 1 glass of wine with dinner  daily, and a cocktail before bed   • Drug use: Not Currently     Types: Cannabis     Comment: 1/2 CBD gummies- for sleep as needed   • Sexual activity: Yes     Partners: Female     History   Drug Use Unknown     Comment: 1/2 CBD gummies- for sleep as needed     Available pre-op labs reviewed.               Airway      Mallampati: III  Mouth opening: 3 FB  TM distance: 4 - 6 cm  Neck ROM: full Cardiovascular      Rhythm: regular  Rate: normal     Dental             Pulmonary      Breath sounds clear to auscultation bilaterally.               Other findings            ASA: 2   Plan: MAC  NPO status verified and patient meets guidelines.    Post-procedure pain management plan discussed with surgeon and patient.    Comment: Plan is MAC anesthesia, which may include deep sedation.  Implied that memory of procedure is unlikely although intraop recall, if it occurs, may be a reasonable and comfortable experience with this anesthetic.  Aware that general anesthesia is not intended though deep sedation may include occasional moments of general anesthesia.  The backup plan for safe patient care may include change of plan to full general anesthesia with potential airway placement.  Patient (legal guardian) demonstrated understanding, accepts risks and benefits, and wishes to proceed as reflected in the signed consent form.  Difficulty airway equipment available as needed, may need general anesthesia OETT.  Previous anesthesia records reviewed.    Plan/risks discussed with: patient and spouse                Present on Admission:  **None**

## 2024-01-24 NOTE — OPERATIVE REPORT
[facility]    PATIENT'S NAME: SHRADDHA LE   ATTENDING PHYSICIAN: Roshan Horowitz D.O.   OPERATING PHYSICIAN: Roshan Horowitz D.O.   PATIENT ACCOUNT#:   [id number]    LOCATION:  [address]  MEDICAL RECORD #:   [id number]       YOB: 1955  ADMISSION DATE:       01/24/2024      OPERATION DATE:  01/24/2023    OPERATIVE REPORT      PREOPERATIVE DIAGNOSIS:  History of colon polyps.  POSTOPERATIVE DIAGNOSIS:  Three colon polyps of the proximal ascending colon.  PROCEDURE:  Pancolonoscopy to cecum with hot snare polypectomy, 3 proximal ascending colon polyps.    ANESTHESIA:  MAC.    PREPARATION:  [place] scale 2 ascending, 2 transverse, 2 descending.    REPEAT COLONOSCOPY:  Three years.    OPERATIVE TECHNIQUE:  An informed consent was previously obtained.  The patient was taken to the endoscopy suite and placed in the left lateral decubitus position.  Digital rectal examination was carried out.  An Olympus colonoscope advanced into the rectal ampulla.  Scope was traversed through rectum, sigmoid, descending, transverse, and ascending colons under direct visualization of the lumen.  The base of the cecum was visualized.  Appendiceal orifice visualized.  The scope was slowly withdrawn into the proximal ascending colon where 3 polyps, each approximately 6 mm in size, were identified, hot snare polypectomied, and retrieved.  The scope was then slowly withdrawn through the remainder of the ascending, transverse, descending, sigmoid colon and rectum, demonstrating no evidence of mucosal disease, masses, polyps, or other lesions.  Cicatrix from a prior anastomotic site at the mid sigmoid colon was identified, was found to be intact and widely patent.  The scope was removed.    Dictated By Roshan Horowitz D.O.  d: 01/24/2024 08:24:11  t: 01/24/2024 09:39:28  [facility] [id number]  /

## 2024-02-01 ENCOUNTER — PATIENT OUTREACH (OUTPATIENT)
Facility: LOCATION | Age: 69
End: 2024-02-01

## 2024-02-05 ENCOUNTER — TELEMEDICINE (OUTPATIENT)
Dept: FAMILY MEDICINE CLINIC | Facility: CLINIC | Age: 69
End: 2024-02-05
Payer: MEDICARE

## 2024-02-05 ENCOUNTER — TELEPHONE (OUTPATIENT)
Dept: FAMILY MEDICINE CLINIC | Facility: CLINIC | Age: 69
End: 2024-02-05

## 2024-02-05 DIAGNOSIS — H10.021 PINK EYE DISEASE OF RIGHT EYE: Primary | ICD-10-CM

## 2024-02-05 PROCEDURE — 99213 OFFICE O/P EST LOW 20 MIN: CPT | Performed by: FAMILY MEDICINE

## 2024-02-05 RX ORDER — GENTAMICIN SULFATE 3 MG/ML
SOLUTION/ DROPS OPHTHALMIC
Qty: 5 ML | Refills: 0 | Status: SHIPPED | OUTPATIENT
Start: 2024-02-05

## 2024-02-05 NOTE — TELEPHONE ENCOUNTER
SCHEDULED     Future Appointments   Date Time Provider Department Center   2/18/2024 11:00 AM PF CT RM1 PF CT [place]

## 2024-02-05 NOTE — PROGRESS NOTES
My Chart/ Video/Telephone Visit Check-In Due to COVID 19 Crisis     Calvin May and/or caregiver verbally consents a video Check-In service on 02/05/24.  Patient understands and accepts financial responsibility for any deductible, co-insurance and/or co-pays associated with this service.    Duration of the service including reviewing the chart, engaging with the patient and giving medical guidance: 7 minutes    Calvin May is a 68 year old male.    CC:  Pink eye    HPI:  R eye irritation for a few weeks. Redness at the lids getting worse and he is waking up with yellow crusting. No fevers. No exposures. No traumas. OTC Visine drops not helping.   Allergies:  No Known Allergies   Current Meds:  Current Outpatient Medications   Medication Sig Dispense Refill    Olmesartan Medoxomil-HCTZ 40-12.5 MG Oral Tab Take 1 tablet by mouth daily. 90 tablet 3    Ascorbic Acid (VITAMIN C) 100 MG Oral Tab Take 1 tablet (100 mg total) by mouth as needed.      vitamin B-12 50 MCG Oral Tab Take 1 tablet (50 mcg total) by mouth daily.      ezetimibe 10 MG Oral Tab Take 1 tablet (10 mg total) by mouth daily. 90 tablet 3    diphenhydrAMINE-APAP, sleep, (TYLENOL PM EXTRA STRENGTH)  MG Oral Tab Take 1 tablet by mouth nightly.      Sildenafil Citrate 25 MG Oral Tab Take 1 tablet (25 mg total) by mouth daily as needed for Erectile Dysfunction. 1-4 tabs qd prn  0        History:  Past Medical History:   Diagnosis Date    Abnormal Heart Score CT 08/2019    281.3    Adenomatous colon polyp     Anesthesia complication     woke up during propofol > 20 yrs ago- during [facility]    Arthritis of facet joint of lumbar spine     Calculus of kidney     right  kidney > 10 years ago    Difficult intubation     see airway status explanation    Diverticulosis of colon (without mention of hemorrhage)     Exposure to medical diagnostic radiation     completed in 2017    Facet arthritis of cervical region     History of prostate cancer  04/2016    s/p prostatectomy    History of SCC (squamous cell carcinoma) of skin 2012     Forehead    Hx of motion sickness     on a boat    Hyperlipidemia     Hypertension     Left thyroid nodule 2019     Biopsied 2019 per Dr. Yen    MGUS (monoclonal gammopathy of unknown significance)     Dr. 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    ARTHROSCOPY, SHOULDER, SURGI Left 02/17/2020    Rotator Cuff repair, Dr. Castelan    CHOLECYSTECTOMY  2020    COLECTOMY      sigmoid, due to diverticular disease-? 2012    COLONOSCOPY N/A 01/16/2019    Procedure: COLONOSCOPY, POSSIBLE BIOPSY, POSSIBLE POLYPECTOMY 73362;  Surgeon: Roshan Horowitz DO;  Location: [facility]    COLONOSCOPY N/A 03/03/2021    Procedure: COLONOSCOPY, POSSIBLE BIOPSY, POSSIBLE POLYPECTOMY 39469;  Surgeon: Roshan Horowitz DO;  Location: [facility]    COLONOSCOPY N/A 1/24/2024    Procedure: COLONOSCOPY with HOT SNARE POLYPECTOMY;  Surgeon: Roshan Horowitz DO;  Location:  ENDOSCOPY    COLONOSCOPY,DIAGNOSTIC N/A 03/09/2016    Procedure: COLONOSCOPY,POSSIBLE BIOPSY,POSSIBLE POLYPECTOMY;  Surgeon: Roshan Horowitz DO;  Location: [facility]    IR EPIDURAL STERIOD INJECTION      neck    LAPAROSCOPIC CHOLECYSTECTOMY  01/2021     Dr. Horowitz    LYSIS OF ADHESIONS  01/2021     Dr. Horowitz, done at time of GB removal    NEPHRECTOMY Left 2022    Partial    OTHER SURGICAL HISTORY      prostatectomy-? [id number]    OTHER SURGICAL HISTORY  11/04/2022    Laparoscopic robotic-assisted excision of left retroperitoneal mass    SPINE SURGERY PROCEDURE UNLISTED      cervical fusion 4-7x2, lumbar  fusions x1, laminectomies x3    UMBILICAL HERNIA REPAIR N/A 11/10/2014    Procedure: HERNIA UMBILICAL REPAIR ADULT;  Surgeon: Roshan Horowitz DO;  Location: [facility]    VASECTOMY        Family History   Problem Relation Age of Onset    Hypertension Father     Cancer Mother         Breast/CA breast    Heart Disorder Mother     Breast Cancer Mother     Diabetes Mother     Other (Colon cancer) Mother     Breast Cancer Sister     Cancer Sister         CA breast    Cancer Paternal Grandmother         Colon cancer      Family Status   Relation Status    Fa  at age 76        Heart disease    Mo Alive    Sis (Not Specified)    PGMA (Not Specified)      Social History     Socioeconomic History    Marital status:    Tobacco Use    Smoking status: Former     Packs/day: 0.50     Years: 4.00     Additional pack years: 0.00     Total pack years: 2.00     Types: Cigarettes     Quit date: 2012     Years since quittin.4     Passive exposure: Never    Smokeless tobacco: Never    Tobacco comments:     social   Vaping Use    Vaping Use: Never used   Substance and Sexual Activity    Alcohol use: Yes     Alcohol/week: 14.0 standard drinks of alcohol     Types: 7 Glasses of wine, 7 Standard drinks or equivalent per week     Comment: 1 glass of wine with dinner daily, and a cocktail before bed    Drug use: Not Currently     Types: Cannabis     Comment: 1/2 CBD gummies- for sleep as needed    Sexual activity: Yes     Partners: Female        ROS:  General: energy level stable       Physical Exam:  General: No apparent distress when conversing with me  Eye: R upper and lower lids with mild erythema and edema along with mild crusting. R conjunctiva injected mildly, PERRLA, EOMI  Psych: Alert and oriented x 3, speech was not pressured  Resp: No respiratory distress noted when conversing with me, able to speak in full sentences.     ASSESSMENT AND PLAN    1. Pink eye disease of right eye    - gentamicin 0.3 %  Ophthalmic Solution; 2 drops to affected eye(s) tid x 5 days  Dispense: 5 mL; Refill: 0      No follow-ups on file.    Orders for this visit:    No orders of the defined types were placed in this encounter.      None    Meds & Refills for this Visit:  Requested Prescriptions     Signed Prescriptions Disp Refills    gentamicin 0.3 % Ophthalmic Solution 5 mL 0     Si drops to affected eye(s) tid x 5 days             Authorized by Erwin Rodriguez M.D.      Please note that the following visit was completed using two-way, real-time interactive audio and/or video communication.  This has been done in good brandon to provide continuity of care in the best interest of the provider-patient relationship, due to the ongoing public health crisis/national emergency and because of restrictions of visitation.  There are limitations of this visit as no physical exam could be performed.  Every conscious effort was taken to allow for sufficient and adequate time.  This billing was spent on reviewing labs, medications, radiology tests and decision making.  Appropriate medical decision-making and tests are ordered as detailed in the plan of care above.”

## 2024-02-05 NOTE — TELEPHONE ENCOUNTER
Pt states he saw TJ earlier in Jan- he was starting to get pink eye around that time.    He states he thought he  had some drops left at home- but they were dried up. Tried to buy some drops at the pharmacy- but they are no longer OTC    Has been using Visine and it is not helping    One eye is crusty/closed this morning - mixture of goopy and tears.  The eye is red and puffy.  Pt feels that is feeling the itching in the other eye.     No fever, no sinus congestion or pressure.     Pt states he uses [facility] in [place] on [address]

## 2024-02-05 NOTE — TELEPHONE ENCOUNTER
Pt states he has pink eye in the right eye, now moving to left.     Right eye sticky / glued shut this am.     Pt requesting medication to treat.    Please advise, thank you!

## 2024-02-12 ENCOUNTER — TELEPHONE (OUTPATIENT)
Dept: FAMILY MEDICINE CLINIC | Facility: CLINIC | Age: 69
End: 2024-02-12

## 2024-02-12 RX ORDER — AMOXICILLIN AND CLAVULANATE POTASSIUM 500; 125 MG/1; MG/1
TABLET, FILM COATED ORAL
Qty: 20 TABLET | Refills: 0 | Status: SHIPPED | OUTPATIENT
Start: 2024-02-12 | End: 2024-02-22

## 2024-02-12 NOTE — TELEPHONE ENCOUNTER
Patient states symptoms started getting better but are now worse.  Eye looks more irritated and is having some pain around the eye when he touches.  States has uses the eye drops for over 5 days.  States SUHAS said he may need oral antibiotic    Routed to TJ to advise

## 2024-02-12 NOTE — TELEPHONE ENCOUNTER
Please let patient or caregiver know or leave message that:   An oral antibiotic has been sent.  Thanks     Outpatient Medication Detail     Disp Refills Start End    amoxicillin clavulanate (AUGMENTIN) 500-125 MG Oral Tab 20 tablet 0 2024    Si tab bid x 10 days with food    Sent to pharmacy as: Amoxicillin-Pot Clavulanate 500-125 MG Oral Tablet (Augmentin)    E-Prescribing Status: Receipt confirmed by pharmacy (2024  9:26 AM CST)      Pharmacy    [id number] - [place], [place] - [address], [phone], [phone]

## 2024-02-18 ENCOUNTER — HOSPITAL ENCOUNTER (OUTPATIENT)
Dept: CT IMAGING | Age: 69
Discharge: HOME | End: 2024-02-18
Attending: FAMILY MEDICINE

## 2024-02-18 DIAGNOSIS — I10 HYPERTENSION, UNSPECIFIED TYPE: ICD-10-CM

## 2024-02-18 DIAGNOSIS — R93.1 ABNORMAL HEART SCORE CT: ICD-10-CM

## 2024-02-19 ENCOUNTER — TELEPHONE (OUTPATIENT)
Dept: FAMILY MEDICINE CLINIC | Facility: CLINIC | Age: 69
End: 2024-02-19

## 2024-02-19 NOTE — TELEPHONE ENCOUNTER
Per patient, states he has 3 days left of amoxicillin but feels he has no improvement. Also states he thinks the infection is going into the other eye now.

## 2024-02-19 NOTE — TELEPHONE ENCOUNTER
If his eye symptoms are not better with topical and oral antibiotics then it is likely not a bacterial infection causing symptoms.   I recommend he see his Eye doctor to further look into this issue. If he does not have one then I typically recommend [facility], [phone].   Thanks

## 2024-04-15 VITALS — SYSTOLIC BLOOD PRESSURE: 138 MMHG | DIASTOLIC BLOOD PRESSURE: 66 MMHG

## 2024-04-15 RX ORDER — LISINOPRIL AND HYDROCHLOROTHIAZIDE 20; 12.5 MG/1; MG/1
1 TABLET ORAL EVERY MORNING
Qty: 90 TABLET | Refills: 3 | OUTPATIENT
Start: 2024-04-15

## 2024-04-23 DIAGNOSIS — I10 HYPERTENSION, UNSPECIFIED TYPE: ICD-10-CM

## 2024-04-23 RX ORDER — OLMESARTAN MEDOXOMIL AND HYDROCHLOROTHIAZIDE 40/12.5 40; 12.5 MG/1; MG/1
1 TABLET ORAL DAILY
Qty: 90 TABLET | Refills: 2 | Status: SHIPPED | OUTPATIENT
Start: 2024-04-23

## 2024-09-03 RX ORDER — EZETIMIBE 10 MG/1
10 TABLET ORAL DAILY
Qty: 90 TABLET | Refills: 0 | Status: SHIPPED | OUTPATIENT
Start: 2024-09-03

## 2024-09-20 ENCOUNTER — TELEPHONE (OUTPATIENT)
Dept: FAMILY MEDICINE CLINIC | Facility: CLINIC | Age: 69
End: 2024-09-20

## 2024-09-20 NOTE — TELEPHONE ENCOUNTER
Patient's name and  verified     If you can also put in the notes a to defer his jury commitment.     Patient said either way is good with him.     Please Advise

## 2024-09-20 NOTE — TELEPHONE ENCOUNTER
PATIENT IS CALLING THIS MORNING.  HE IS SCHEDULED FOR JURY DUTY ON 10-.  PATIENT REQUESTING A LETTER STATING HE HAS PROSTATE CANCER, CERVICAL LUMBAR FUSION.  PATIENT CANNOT SIT FOR LONG PERIODS OF TIME AND NEEDS CONSTANT BATHROOM BRAKES.    PLEASE FAX: [phone]  ATTENTION: JURY COMMISION

## 2024-09-20 NOTE — TELEPHONE ENCOUNTER
Technically, I can state he has a history of prostate cancer and has had prostatectomy which has left with need for frequent urination. I can also state he has had the cervical and lumbar surgeries and do to ongoing pain he needs to move/ambulate frequently. Let me know if that type verbage is fine.    Thanks

## 2024-09-23 ENCOUNTER — TELEPHONE (OUTPATIENT)
Facility: LOCATION | Age: 69
End: 2024-09-23

## 2024-09-23 NOTE — TELEPHONE ENCOUNTER
Spoke with patient, scheduled 9/24/25.  Future Appointments   Date Time Provider Department Center   9/24/2024  9:45 AM Roshan Horowitz,  [id number]

## 2024-09-23 NOTE — TELEPHONE ENCOUNTER
Patient called to speak with nurse he is having a diverticulitis flare up and want to know if he can be put on antibiotics. He is scheduled to see Dr. Horowitz on 10/01.    Please advise   # [phone]

## 2024-09-24 ENCOUNTER — OFFICE VISIT (OUTPATIENT)
Facility: LOCATION | Age: 69
End: 2024-09-24
Payer: MEDICARE

## 2024-09-24 VITALS
TEMPERATURE: 97 F | HEART RATE: 65 BPM | OXYGEN SATURATION: 98 % | DIASTOLIC BLOOD PRESSURE: 100 MMHG | SYSTOLIC BLOOD PRESSURE: 180 MMHG

## 2024-09-24 DIAGNOSIS — R19.7 DIARRHEA, UNSPECIFIED TYPE: Primary | ICD-10-CM

## 2024-09-24 PROCEDURE — 3080F DIAST BP >= 90 MM HG: CPT | Performed by: SURGERY

## 2024-09-24 PROCEDURE — 1159F MED LIST DOCD IN RCRD: CPT | Performed by: SURGERY

## 2024-09-24 PROCEDURE — 3077F SYST BP >= 140 MM HG: CPT | Performed by: SURGERY

## 2024-09-24 PROCEDURE — 99214 OFFICE O/P EST MOD 30 MIN: CPT | Performed by: SURGERY

## 2024-09-24 PROCEDURE — 1170F FXNL STATUS ASSESSED: CPT | Performed by: SURGERY

## 2024-09-24 NOTE — H&P
Calvin May is a 69 year old male  Chief Complaint   Patient presents with    [facility]     EP- Diverticulitis flare up - reports abdominal cramping and discomfort mostly on the left side, diarrhea, denies fevers but has chills, reports rectal pain and pressure, bleeding in stools , Ct Abd/Pel on 6/5/24        REFERRED BY    Patient presents with complaint of watery diarrhea.  Patient states that he has been having intermittent episodes on and off for the past 6 months.  He is uncertain whether it is medication related.  He states that he was fishing in [place] [employer] last week and in the past week has had repeated episodes of watery diarrhea with associated bloating.  He denies blood or mucus.  He denies nausea or vomiting he denies fever or chills.       .           Past Medical History:    Abnormal Heart Score CT    281.3    Adenomatous colon polyp    Anesthesia complication    woke up during propofol > 20 yrs ago- during J[facility]NIOR    Angiolipoma of kidney    Arthritis of facet joint of lumbar spine    Calculus of kidney    right  kidney > 10 years ago    Difficult intubation    see airway status explanation    Diverticulosis of colon (without mention of hemorrhage)    Exposure to medical diagnostic radiation    completed in 2017    Facet arthritis of cervical region    History of prostate cancer    s/p prostatectomy    History of SCC (squamous cell carcinoma) of skin     Forehead    Hx of motion sickness    on a boat    Hyperlipidemia    Hypertension    Left thyroid nodule     Biopsied 2019 per Dr. Yen    MGUS (monoclonal gammopathy of unknown significance)    Dr. 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    Arthroscopy, shoulder, surgi Left 02/17/2020    Rotator Cuff repair, Dr. Castelan    Cholecystectomy  2020    Colectomy      sigmoid, due to diverticular disease-? 2012    Colonoscopy N/A 01/16/2019    Procedure: COLONOSCOPY, POSSIBLE BIOPSY, POSSIBLE POLYPECTOMY 69665;  Surgeon: Roshan Horowitz DO;  Location: [facility]    Colonoscopy N/A 03/03/2021    Procedure: COLONOSCOPY, POSSIBLE BIOPSY, POSSIBLE POLYPECTOMY 72142;  Surgeon: Roshan Horowitz DO;  Location: [facility]    Colonoscopy N/A 1/24/2024    Procedure: COLONOSCOPY with HOT SNARE POLYPECTOMY;  Surgeon: Roshan Horowitz DO;  Location:  ENDOSCOPY    Colonoscopy,diagnostic N/A 03/09/2016    Procedure: COLONOSCOPY,POSSIBLE BIOPSY,POSSIBLE POLYPECTOMY;  Surgeon: Roshan Horowitz DO;  Location: [facility]    Ir epidural steriod injection      neck    Laparoscopic cholecystectomy  01/2021     Dr. Horowitz    Lysis of adhesions  01/2021     Dr. Horowitz, done at time of GB removal    Nephrectomy Left 2022    Partial    Other surgical history      prostatectomy-? [id number]    Other surgical history  11/04/2022    Laparoscopic robotic-assisted excision of left retroperitoneal mass    Spine surgery procedure unlisted      cervical fusion 4-7x2, lumbar fusions x1, laminectomies x3    Umbilical hernia repair N/A 11/10/2014    Procedure: HERNIA UMBILICAL REPAIR ADULT;  Surgeon: Roshan Horowitz DO;  Location:  MAIN OR    Vasectomy  1986     Current Outpatient Medications on File Prior to Visit   Medication Sig Dispense Refill    ezetimibe 10 MG Oral Tab Take 1 tablet (10 mg total) by mouth daily. 90 tablet 0    Olmesartan Medoxomil-HCTZ 40-12.5 MG Oral Tab Take 1 tablet by mouth daily. 90 tablet 2    Ascorbic Acid (VITAMIN C) 100 MG Oral Tab Take 1 tablet (100 mg total) by mouth as needed.      vitamin B-12 50 MCG Oral Tab Take 1 tablet (50 mcg total) by mouth daily.      diphenhydrAMINE-APAP, sleep,  (TYLENOL PM EXTRA STRENGTH)  MG Oral Tab Take 1 tablet by mouth nightly.      Sildenafil Citrate 25 MG Oral Tab Take 1 tablet (25 mg total) by mouth daily as needed for Erectile Dysfunction. 1-4 tabs qd prn  0     No current facility-administered medications on file prior to visit.     @ALL@  Family History   Problem Relation Age of Onset    Hypertension Father     Cancer Mother         Breast/CA breast    Heart Disorder Mother     Breast Cancer Mother     Diabetes Mother     Other (Colon cancer) Mother     Breast Cancer Sister     Cancer Sister         CA breast    Cancer Paternal Grandmother         Colon cancer     Social History     Socioeconomic History    Marital status:    Tobacco Use    Smoking status: Former     Current packs/day: 0.00     Average packs/day: 0.5 packs/day for 4.0 years (2.0 ttl pk-yrs)     Types: Cigarettes     Start date: 2008     Quit date: 2012     Years since quittin.0     Passive exposure: Never    Smokeless tobacco: Never    Tobacco comments:     social   Vaping Use    Vaping status: Never Used   Substance and Sexual Activity    Alcohol use: Yes     Alcohol/week: 14.0 standard drinks of alcohol     Types: 7 Glasses of wine, 7 Standard drinks or equivalent per week     Comment: 1 glass of wine with dinner daily, and a cocktail before bed    Drug use: Not Currently     Types: Cannabis     Comment:  CBD gummies- for sleep as needed    Sexual activity: Yes     Partners: Female     Social Determinants of Health     Food Insecurity: No Food Insecurity (2024)    Received from [facility]    Food Insecurity     Currently or in the past 3 months, have you worried your food would run out before you had money to buy more?: No     In the past 12 months, have you run out of food or been unable to get more?: No   Transportation Needs: No Transportation Needs (2024)    Received from [facility]    Transportation Needs      Medical Transportation Needs?: No    [facility], [facility]    Social Connections    Received from [facility], [facility]    Housing Stability       ROS     GENERAL HEALTH: otherwise feels well, no weight loss, no fever or chills  SKIN: denies any unusual skin rashes or jaundice  HEENT: denies nasal congestion, sinus pain or sore throat; hearing loss negative,   EYES , no diplopia or vision changes  RESPIRATORY: denies shortness of breath, wheezing or cough   CARDIOVASCULAR: denies chest pain or HUNTLEY; no palpitations   GI: denies nausea, vomiting, constipation, diarrhea; no rectal bleeding; no heartburn  GENITAL/: no dysuria, urgency or frequency, no tea colored urine  MUSCULOSKELETAL: no joint complaints upper or lower extremities  HEMATOLOGY: denies hx anemia; denies bruising or excessive bleeding  Endocrine: no weight gain or loss no hot or cold intolerance    EXAM     Blood pressure (!) 180/100, pulse 65, temperature 97 °F (36.1 °C), temperature source Temporal, SpO2 98%.  GENERAL: well developed, well nourished male, in no apparent distress.    MENTAL STATUS :Alert, oriented x 3  PSYCH: normal mood and affect  SKIN: anicteric, no rashes, no bruising  EYES: PERRLA, EOMI, sclera anicteric,  conjunctiva without pallor  HEENT: normocephalic, atraumatic, TMs clear, nares patent, mouth moist, pharynx w/o erythema  NECK: supple, no JVD, no adenopathy, no thyromegaly  RESPIRATORY: clear to auscultation, no rales , rhonchi or wheezing  CARDIOVASCULAR: RRR, murmur negative  ABDOMEN: normal active BS, soft, nondistended  no HSM, no masses or hernias  LYMPHATIC: no axillary , supraclavicular or inguinal lymphadenopathy  EXTREMITIES: no cyanosis, clubbing or edema, no atrophy, full ROM    STUDIES:     Admission on 01/24/2024, Discharged on 01/24/2024   Component Date Value Ref Range Status    Case Report 01/24/2024    Final                     Value:Surgical Pathology                                Case: [id number]                                   Authorizing Provider:  Roshan Horowitz DO         Collected:           01/24/2024 07:54 AM          Ordering Location:     [facility]  Received:            01/24/2024 10:47 AM                                 [facility]                                                                  Pathologist:           Harry Hamilton MD                                                           Specimens:   A) - Colon ascending, Colon ascending polypsx2                                                      B) - Colon ascending, PROXIMAL ASCENDING COLON POLYP                                       Final Diagnosis: 01/24/2024    Final                    Value:This result contains rich text formatting which cannot be displayed here.    Clinical Information 01/24/2024    Final                    Value:This result contains rich text formatting which cannot be displayed here.    Gross Description 01/24/2024    Final                    Value:This result contains rich text formatting which cannot be displayed here.       ASSESSMENT   Imp: Patient with new onset diarrhea advised patient start Pepto-Bismol and probiotic.  Will do stool studies for enteric pathogens and parasites in the light of out of country travel.  Will follow-up with stool study results.        Meds & Refills for this Visit:  Requested Prescriptions      No prescriptions requested or ordered in this encounter       Imaging & Consults:  None

## 2024-09-25 ENCOUNTER — TELEPHONE (OUTPATIENT)
Dept: FAMILY MEDICINE CLINIC | Facility: CLINIC | Age: 69
End: 2024-09-25

## 2024-09-25 NOTE — TELEPHONE ENCOUNTER
Pt states that Jury Commission is saying they haven't received letter from Dr. Rodriguez.  Letter faxed and sent to:    [phone]    Attn: [place] [employer]  [place] [place]

## 2024-09-25 NOTE — TELEPHONE ENCOUNTER
Patient's name and  verified   Faxed letter to Jury commission  Patient notified and verbalized an understanding

## 2024-09-26 ENCOUNTER — LAB ENCOUNTER (OUTPATIENT)
Dept: LAB | Facility: HOSPITAL | Age: 69
End: 2024-09-26
Attending: SURGERY
Payer: MEDICARE

## 2024-09-26 DIAGNOSIS — R19.7 DIARRHEA, UNSPECIFIED TYPE: ICD-10-CM

## 2024-09-26 LAB
CRYPTOSP AG STL QL IA: NEGATIVE
G LAMBLIA AG STL QL IA: NEGATIVE

## 2024-09-26 PROCEDURE — 87493 C DIFF AMPLIFIED PROBE: CPT

## 2024-09-26 PROCEDURE — 87329 GIARDIA AG IA: CPT

## 2024-09-26 PROCEDURE — 87045 FECES CULTURE AEROBIC BACT: CPT

## 2024-09-26 PROCEDURE — 87272 CRYPTOSPORIDIUM AG IF: CPT

## 2024-09-26 PROCEDURE — 87046 STOOL CULTR AEROBIC BACT EA: CPT

## 2024-09-26 PROCEDURE — 87427 SHIGA-LIKE TOXIN AG IA: CPT

## 2024-09-26 PROCEDURE — 87015 SPECIMEN INFECT AGNT CONCNTJ: CPT

## 2024-09-26 PROCEDURE — 87177 OVA AND PARASITES SMEARS: CPT

## 2024-09-26 PROCEDURE — 87209 SMEAR COMPLEX STAIN: CPT

## 2024-09-27 LAB — C DIFF TOX B STL QL: NEGATIVE

## 2024-12-31 PROBLEM — Z85.46 HISTORY OF PROSTATE CANCER: Status: ACTIVE | Noted: 2024-12-31

## 2025-01-08 ENCOUNTER — OFFICE VISIT (OUTPATIENT)
Dept: FAMILY MEDICINE CLINIC | Facility: CLINIC | Age: 70
End: 2025-01-08
Payer: MEDICARE

## 2025-01-08 VITALS
WEIGHT: 231.63 LBS | TEMPERATURE: 98 F | DIASTOLIC BLOOD PRESSURE: 74 MMHG | HEART RATE: 71 BPM | BODY MASS INDEX: 37.23 KG/M2 | HEIGHT: 66 IN | OXYGEN SATURATION: 96 % | SYSTOLIC BLOOD PRESSURE: 116 MMHG

## 2025-01-08 DIAGNOSIS — E78.49 OTHER HYPERLIPIDEMIA: ICD-10-CM

## 2025-01-08 DIAGNOSIS — Z00.00 MEDICARE ANNUAL WELLNESS VISIT, SUBSEQUENT: Primary | ICD-10-CM

## 2025-01-08 DIAGNOSIS — D47.2 MGUS (MONOCLONAL GAMMOPATHY OF UNKNOWN SIGNIFICANCE): ICD-10-CM

## 2025-01-08 DIAGNOSIS — I25.84 CORONARY ARTERY DISEASE DUE TO CALCIFIED CORONARY LESION: ICD-10-CM

## 2025-01-08 DIAGNOSIS — R93.1 ABNORMAL HEART SCORE CT: ICD-10-CM

## 2025-01-08 DIAGNOSIS — Z85.46 HISTORY OF PROSTATE CANCER: ICD-10-CM

## 2025-01-08 DIAGNOSIS — I10 PRIMARY HYPERTENSION: ICD-10-CM

## 2025-01-08 DIAGNOSIS — Z12.5 PROSTATE CANCER SCREENING: ICD-10-CM

## 2025-01-08 DIAGNOSIS — E04.1 THYROID NODULE: ICD-10-CM

## 2025-01-08 DIAGNOSIS — I25.10 CORONARY ARTERY DISEASE DUE TO CALCIFIED CORONARY LESION: ICD-10-CM

## 2025-01-08 LAB
CHOLEST SERPL-MCNC: 225 MG/DL (ref ?–200)
COMPLEXED PSA SERPL-MCNC: <0.04 NG/ML (ref ?–4)
FASTING PATIENT LIPID ANSWER: YES
HDLC SERPL-MCNC: 74 MG/DL (ref 40–59)
LDLC SERPL CALC-MCNC: 139 MG/DL (ref ?–100)
NONHDLC SERPL-MCNC: 151 MG/DL (ref ?–130)
TRIGL SERPL-MCNC: 72 MG/DL (ref 30–149)
TSI SER-ACNC: 1.93 UIU/ML (ref 0.55–4.78)
VLDLC SERPL CALC-MCNC: 13 MG/DL (ref 0–30)

## 2025-01-08 PROCEDURE — 3078F DIAST BP <80 MM HG: CPT | Performed by: FAMILY MEDICINE

## 2025-01-08 PROCEDURE — 80061 LIPID PANEL: CPT | Performed by: FAMILY MEDICINE

## 2025-01-08 PROCEDURE — 1159F MED LIST DOCD IN RCRD: CPT | Performed by: FAMILY MEDICINE

## 2025-01-08 PROCEDURE — 84443 ASSAY THYROID STIM HORMONE: CPT | Performed by: FAMILY MEDICINE

## 2025-01-08 PROCEDURE — G0439 PPPS, SUBSEQ VISIT: HCPCS | Performed by: FAMILY MEDICINE

## 2025-01-08 PROCEDURE — 96160 PT-FOCUSED HLTH RISK ASSMT: CPT | Performed by: FAMILY MEDICINE

## 2025-01-08 PROCEDURE — 1170F FXNL STATUS ASSESSED: CPT | Performed by: FAMILY MEDICINE

## 2025-01-08 PROCEDURE — 1125F AMNT PAIN NOTED PAIN PRSNT: CPT | Performed by: FAMILY MEDICINE

## 2025-01-08 PROCEDURE — 3074F SYST BP LT 130 MM HG: CPT | Performed by: FAMILY MEDICINE

## 2025-01-08 PROCEDURE — 3008F BODY MASS INDEX DOCD: CPT | Performed by: FAMILY MEDICINE

## 2025-01-08 PROCEDURE — 1160F RVW MEDS BY RX/DR IN RCRD: CPT | Performed by: FAMILY MEDICINE

## 2025-01-08 RX ORDER — OLMESARTAN MEDOXOMIL AND HYDROCHLOROTHIAZIDE 40/25 40; 25 MG/1; MG/1
1 TABLET ORAL DAILY
COMMUNITY
Start: 2024-12-18

## 2025-01-08 NOTE — PROGRESS NOTES
Calvin May is a 69 year old male.    CC:    Chief Complaint   Patient presents with    Physical     MAW.   Reviewed Preventative/Wellness form with patient.         HPI:  Yearly PX    Last PSA:   Recent Results (from the past 823658 hours)   PSA Screen    Collection Time: 09/12/19 12:00 AM   Result Value Ref Range    PSA Screen 0.10 ng/mL     *Note: Due to a large number of results and/or encounters for the requested time period, some results have not been displayed. A complete set of results can be found in Results Review.        Last lipid:  Lab Results   Component Value Date    CHOLEST 209 (H) 01/18/2024    TRIG 54 01/18/2024    HDL 83 (H) 01/18/2024     (H) 01/18/2024    VLDL 9 01/18/2024    TCHDLRATIO 2.34 11/29/2016    NONHDLC 126 01/18/2024       Last Colon Cancer screening: Colonoscopy  01/24/2024 to be repeated 2027      Immunization History   Administered Date(s) Administered    Covid-19 Vaccine Pfizer 30 mcg/0.3 ml 03/06/2021, 03/27/2021, 10/03/2021    Covid-19 Vaccine Pfizer Bivalent 30mcg/0.3mL 09/26/2022    Covid-19 Vaccine Pfizer Maged-Sucrose 30 mcg/0.3 ml 03/31/2022    FLU VAC High Dose 65 YRS & Older PRSV Free (68935) 11/19/2021, 09/26/2022, 12/22/2023    FLUAD High Dose 65 yr and older (84650) 11/15/2024    FLULAVAL 6 months & older 0.5 ml Prefilled syringe (81479) 12/07/2018, 01/23/2020, 12/18/2020    High Dose Fluzone Influenza Vaccine, 65yr+ PF 0.5mL (66446) 11/19/2021    Influenza 11/10/2016    Pfizer Covid-19 Vaccine 30mcg/0.3ml 12yrs+ 12/22/2023, 11/15/2024    Pneumococcal (Prevnar 13) 12/07/2018    Pneumococcal Conjugate PCV20 11/15/2024    Pneumovax 23 12/18/2020    RSV, recombinant, RSVpreF, adjuvanted (Arexvy) 12/22/2023    TD 11/09/2016       Patient Active Problem List   Diagnosis    Hypertension: Taking BP meds as directed , no home BP to review    MGUS (monoclonal gammopathy of unknown significance): follows regularly with his Oncologist,    Other hyperlipidemia:  has not tolerated statins, currently on Zeita. CT heart score 2024 was 591. He did see a Cardiologist as recommended. He went through a stress ECHO last year that was interpreted as negative for evidence of ischemia. He also underwent B carotid ultrasound that negative for significant stenosis. Cardiology did adjust his BP medication, but otherwise no other intervention was recommended.     History of prostate cancer: no longer follows with Urology, Oncology did not do PSA with recent labs. No urinary symptoms.     Thyroid nodule: last biopsy results are below. He was not able to f/u with ENT as I recommended last year    PATH #:[id number]  SPECIMEN DATE:  Accession #: [id number]   Date of Surgery: 8/29/2019 12:22 PM     FINAL CYTOPATHOLOGIC DIAGNOSIS:   Ultrasound guided fine-needle aspiration biopsy of left mid thyroid lobe   nodule: Follicular Lesion Of Undetermined   Significance.   Moderately cellular aspirate smears with follicular cells in microfollicular   pattern and few in sheets, and scant colloid;   differential diagnosis includes a benign adenomatous nodule versus a   follicular neoplasm.   Adequacy: Satisfactory.        General Health     In the past six months, have you lost more than 10 pounds without trying?: 2 - No    Has your appetite been poor?: No    Type of Diet: Balanced    How does the patient maintain a good energy level?: Appropriate Exercise;Daily Walks    How would you describe your daily physical activity?: Moderate    How would you describe your current health state?: Good    How do you maintain positive mental well-being?: Visiting Friends;Visiting Family;Social Interaction         Have you had any immunizations at another office such as Influenza, Hepatitis B, Tetanus, or Pneumococcal?: No     Functional Ability     Bathing or Showering: Able without help    Toileting: Able without help    Dressing: Able without help    Eating: Able without help    Driving: Able without help    Preparing  your meals: Able without help    Managing money/bills: Able without help    Taking medications as prescribed: Able without help    Are you able to afford your medications?: Yes    Hearing Problems?: No     Functional Status     Hearing Problems?: No    Vision Problems? : No    Difficulty walking?: No    Difficulty dressing or bathing?: No    Problems with daily activities? : No    Memory Problems?: No      Fall Screen: See Flow sheet  Fall Risk Assessment:   He has been screened for Falls and is low risk.        PHQ-2 SCORE: 0  , done 1/8/2025           Advance Directives     Do you have a healthcare power of ?: No    Do you have a living will?: No     Hearing Assessment (Required for AWV/SWV)      Hearing Screening    Screening Method: Finger Rub  Finger Rub Result: Pass         Visual Acuity     Right Eye Visual Acuity: Uncorrected Left Eye Visual Acuity: Uncorrected   Right Eye Chart Acuity: 20/20 Left Eye Chart Acuity: 20/20     Cognitive Assessment     What day of the week is this?: Correct    What month is it?: Correct    What year is it?: Correct    Recall \"Ball\": Correct    Recall \"Flag\": Correct    Recall \"Tree\": Correct           Allergies as of 01/08/2025    (No Known Allergies)        Current Meds:  Current Outpatient Medications   Medication Sig Dispense Refill    Olmesartan Medoxomil-HCTZ 40-25 MG Oral Tab Take 1 tablet by mouth daily.      ezetimibe 10 MG Oral Tab Take 1 tablet (10 mg total) by mouth daily. 90 tablet 0    Ascorbic Acid (VITAMIN C) 100 MG Oral Tab Take 1 tablet (100 mg total) by mouth as needed.      vitamin B-12 50 MCG Oral Tab Take 1 tablet (50 mcg total) by mouth daily.      diphenhydrAMINE-APAP, sleep, (TYLENOL PM EXTRA STRENGTH)  MG Oral Tab Take 1 tablet by mouth nightly.      Sildenafil Citrate 25 MG Oral Tab Take 1 tablet (25 mg total) by mouth daily as needed for Erectile Dysfunction. 1-4 tabs qd prn  0        History:  Past Medical History:    Abnormal Heart  Score CT    281.3 in 2019, 591 in 2024    Adenomatous colon polyp    Anesthesia complication    woke up during propofol > 20 yrs ago- during J[facility]OR    Arthritis of facet joint of lumbar spine    CAD (coronary artery disease), native coronary artery    See CT heart score results    Calculus of kidney    right  kidney > 10 years ago    Difficult intubation    see airway status explanation    Diverticulosis of colon (without mention of hemorrhage)    Exposure to medical diagnostic radiation    completed in 2017    Facet arthritis of cervical region    History of prostate cancer    s/p prostatectomy    History of SCC (squamous cell carcinoma) of skin     Forehead    Hx of motion sickness    on a boat    Hyperlipidemia    Hypertension    Left thyroid nodule     Biopsied 2019 per Dr. Yen    MGUS (monoclonal gammopathy of unknown significance)    Dr. 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    Arthroscopy, shoulder, surgi Left 02/17/2020    Rotator Cuff repair, Dr. Castelan    Cholecystectomy  2020    Colectomy      sigmoid, due to diverticular disease-? 2012    Colonoscopy N/A 01/16/2019    Procedure: COLONOSCOPY, POSSIBLE BIOPSY, POSSIBLE POLYPECTOMY 40793;  Surgeon: Roshan Horowitz DO;  Location: [facility]    Colonoscopy N/A 03/03/2021    Procedure: COLONOSCOPY, POSSIBLE BIOPSY, POSSIBLE POLYPECTOMY 40702;  Surgeon: Roshan Horowitz DO;  Location: [facility]    Colonoscopy N/A 1/24/2024    Procedure: COLONOSCOPY with HOT SNARE POLYPECTOMY;  Surgeon: Roshan Horowitz DO;  Location:  ENDOSCOPY    Colonoscopy,diagnostic N/A 03/09/2016    Procedure:  COLONOSCOPY,POSSIBLE BIOPSY,POSSIBLE POLYPECTOMY;  Surgeon: Roshan Horowitz DO;  Location: [facility]    Ir epidural steriod injection      neck    Laparoscopic cholecystectomy  2021     Dr. Horowitz    Lysis of adhesions  2021     Dr. Horowitz, done at time of GB removal    Nephrectomy Left     Partial    Other surgical history      prostatectomy-? [id number]    Other surgical history  2022    Laparoscopic robotic-assisted excision of left retroperitoneal mass    Spine surgery procedure unlisted      cervical fusion 4-7x2, lumbar fusions x1, laminectomies x3    Umbilical hernia repair N/A 11/10/2014    Procedure: HERNIA UMBILICAL REPAIR ADULT;  Surgeon: Roshan Horowitz DO;  Location: [facility]    Vasectomy        Family History   Problem Relation Age of Onset    Hypertension Father     Cancer Mother         Breast/CA breast    Heart Disorder Mother     Breast Cancer Mother     Diabetes Mother     Other (Colon cancer) Mother     Breast Cancer Sister     Cancer Sister         CA breast    Cancer Paternal Grandmother         Colon cancer      Family Status   Relation Status    Fa  at age 76        Heart disease    Mo Alive    Sis (Not Specified)    PGMA (Not Specified)      Social History     Socioeconomic History    Marital status:    Tobacco Use    Smoking status: Former     Current packs/day: 0.00     Average packs/day: 0.5 packs/day for 4.0 years (2.0 ttl pk-yrs)     Types: Cigarettes     Start date: 2008     Quit date: 2012     Years since quittin.3     Passive exposure: Never    Smokeless tobacco: Never    Tobacco comments:     social   Vaping Use    Vaping status: Never Used   Substance and Sexual Activity    Alcohol use: Yes     Alcohol/week: 14.0 standard drinks of alcohol     Types: 7 Glasses of wine, 7 Standard drinks or equivalent per week     Comment: 1 glass of wine with dinner daily, and a cocktail before bed    Drug use: Not Currently     Types: Cannabis      Comment: 1/2 CBD gummies- for sleep as needed    Sexual activity: Yes     Partners: Female     Social Drivers of Health     Food Insecurity: No Food Insecurity (8/14/2024)    Received from [facility]    Food Insecurity     Currently or in the past 3 months, have you worried your food would run out before you had money to buy more?: No     In the past 12 months, have you run out of food or been unable to get more?: No   Transportation Needs: No Transportation Needs (8/14/2024)    Received from [facility]    Transportation Needs     Currently or in the past 3 months, has lack of transportation kept you from medical appointments, getting food or medicine, or providing care to a family member?: Unrecognized value     Medical Transportation Needs?: No    Received from [facility], [facility]    Social Connections    Received from [facility], [facility]    Housing Stability        ROS:  General: energy level stable  Cardiovascular/Pulses: Denies palpitations, exertional chest pain or pressure, orthopnea, lower extremity edema  Respiratory: Denies dyspnea on exertion  MS: spinal arthritis cervical to lumbar. Use Tylenol prn and tries to exercise best he can     Vitals: /74   Pulse 71   Temp 97.9 °F (36.6 °C) (Temporal)   Ht 5' 6\" (1.676 m)   Wt 231 lb 9.6 oz (105.1 kg)   SpO2 96%   BMI 37.38 kg/m²    Reviewed by AMERICA Rodriguez M.D.    Physical Exam:  GEN: well developed, well nourished, in no apparent distress  EYE: B conjunctiva and lids normal  HENT: normocephalic; normal nose, pharynx and TM's  NECK: No lymphadenopathy, thyromegaly or masses  CAR: S1, S2 normal, RRR; no S3, no S4; no click; murmur negative  PULM: clear to auscultation B, no accessory muscle use  GI: normal active BS+, soft, nondistended; no HSM; no masses; no bruits; no masses; nontender, no G/R/R   PSYCH: alert and oriented x  3; affect appropriate  SKIN: not examined  EXTREMITIES: No clubbing, cyanosis or edema  GENITAL: not examined  LYMPH: no supraclavicular nodes  NEURO: Awake and alert. Normal speech and articulation. No facial droop or asymmetry. Moving all extremities equally.    ASSESSMENT AND PLAN    1. Medicare annual wellness visit, subsequent  CBC and CMP done with Heme/Onc in 8/2024  Colon cancer screen in 2027  Await results   - TSH W Reflex To Free T4  - Lipid Panel  - PSA Total, Screen    2. Primary hypertension  Stable   Continue present medications     3. Other hyperlipidemia  Ideally try to get LDL (bad cholesterol) to 70 based on elevated CT heart score in 2024. Unfortunately has not tolerated Lipitor or Crestor  Continue Zetia. Consider add Fenifibrate  Await results   - Lipid Panel    4. MGUS (monoclonal gammopathy of unknown significance)  Care per Oncology    5. History of prostate cancer  Await PSA    6. Thyroid nodule  Await results   - US THYROID (CPT=76536); Future    7. Coronary artery disease due to calcified coronary lesion  Asymptomatic with negative stress testing with in the past year.  See discussion about lipids above    8. Abnormal Heart Score CT  As in 3 and 7  - Lipid Panel; Future  - Lipid Panel    9. Prostate cancer screening  Await results   - PSA Total, Screen; Future  - PSA Total, Screen      No follow-ups on file.    Orders for this visit:    Orders Placed This Encounter   Procedures    TSH W Reflex To Free T4     Standing Status:   Future     Number of Occurrences:   1     Standing Expiration Date:   1/8/2026    Lipid Panel     Standing Status:   Future     Number of Occurrences:   1     Standing Expiration Date:   1/8/2026    PSA Total, Screen     Standing Status:   Future     Number of Occurrences:   1     Standing Expiration Date:   1/8/2026    VENIPUNCTURE     Order Specific Question:   Release to patient     Answer:   Immediate       US THYROID (CPT=76536)    Meds & Refills for this  Visit:  Requested Prescriptions      No prescriptions requested or ordered in this encounter             Authorized by Erwin Rodriguez M.D.

## 2025-01-22 ENCOUNTER — MED REC SCAN ONLY (OUTPATIENT)
Dept: FAMILY MEDICINE CLINIC | Facility: CLINIC | Age: 70
End: 2025-01-22

## 2025-01-22 ENCOUNTER — TELEPHONE (OUTPATIENT)
Dept: FAMILY MEDICINE CLINIC | Facility: CLINIC | Age: 70
End: 2025-01-22

## 2025-01-22 DIAGNOSIS — E04.1 THYROID NODULE: Primary | ICD-10-CM

## 2025-01-22 DIAGNOSIS — E06.5 THYROIDITIS, CHRONIC: ICD-10-CM

## 2025-01-22 NOTE — TELEPHONE ENCOUNTER
PATIENT ASKING IF OK TO WAIT UNTIL FIRST WEEK OF FEBRUARY TO HAVE THEM DONE? PATIENT WILL BE HAVING LABS DONE FOR DR. CHRISTIANSON AND WANTS TO GET THEM DONE ALL AT THE SAME TIME.     PATIENT PROVIDED DR. CHRISTIANSON'S PHONE NUMBER, JUST IN CASE NURSE NEEDS TO GET IN TOUCH WITH HIM. [phone]

## 2025-01-22 NOTE — TELEPHONE ENCOUNTER
Patient's name and  verified   Patient is amenable to the thyroid antibody testing.    Patient stated the [facility] at the [facility] in [place] is fine to send too.     Patient notified and verbalized an understanding

## 2025-01-22 NOTE — TELEPHONE ENCOUNTER
Patient's name and  verified   Per anali Lo to wait until Feb for lab  Patient notified and verbalized an understanding

## 2025-01-22 NOTE — TELEPHONE ENCOUNTER
Called Dr Dan office fax number [phone]    Faxed order for Thyroid antibody to Dr Dan office

## 2025-01-22 NOTE — TELEPHONE ENCOUNTER
Please let patient or caregiver know or leave message that:  His thyroid ultrasound done at [facility] on 1/21/2025 showed stability of thyroid nodule on the left thyroid when compared to the ultrasound from 2019. Radiology states this is a benign nodule on no further imaging is needed. Interestingly, Radiology notes that the thyroid appearance is coarse. This can indicate a chronic, low grade inflammation that has been occurring. I would like to evaluate this further. I would like to do thyroid antibody testing to see if he has Hashimoto's. This is a process where the body's immune system attacks the thyroid. Where shall we do the labs?    Thanks

## 2025-01-28 ENCOUNTER — TELEPHONE (OUTPATIENT)
Dept: FAMILY MEDICINE CLINIC | Facility: CLINIC | Age: 70
End: 2025-01-28

## 2025-01-28 NOTE — TELEPHONE ENCOUNTER
Received paperwork from [facility]  That they wouldn't do the labs from our order there, per Dr Rodriguez patient needs to have labs done at [facility] facility

## 2025-02-05 ENCOUNTER — TELEPHONE (OUTPATIENT)
Dept: FAMILY MEDICINE CLINIC | Facility: CLINIC | Age: 70
End: 2025-02-05

## 2025-02-05 DIAGNOSIS — E04.1 THYROID NODULE: Primary | ICD-10-CM

## 2025-02-05 DIAGNOSIS — E06.5 THYROIDITIS, CHRONIC: ICD-10-CM

## 2025-02-05 NOTE — TELEPHONE ENCOUNTER
[facility] calling and they need new lab order for the Thyroid they can use [facility].       Faxed new order

## 2025-02-12 RX ORDER — EZETIMIBE 10 MG/1
10 TABLET ORAL DAILY
Qty: 90 TABLET | Refills: 3 | Status: SHIPPED | OUTPATIENT
Start: 2025-02-12

## 2025-02-12 NOTE — TELEPHONE ENCOUNTER
Patient calling to request refill of ezetimibe 10 MG Oral Tab   Pharmacy [id number] - [place], [place] - [facility] [address], [phone], [phone] [[id number]]   Patient states he is out of this Rx.

## 2025-02-25 ENCOUNTER — TELEPHONE (OUTPATIENT)
Dept: FAMILY MEDICINE CLINIC | Facility: CLINIC | Age: 70
End: 2025-02-25

## 2025-03-25 ENCOUNTER — PATIENT OUTREACH (OUTPATIENT)
Dept: FAMILY MEDICINE CLINIC | Facility: CLINIC | Age: 70
End: 2025-03-25

## 2025-07-09 ENCOUNTER — TELEPHONE (OUTPATIENT)
Dept: FAMILY MEDICINE CLINIC | Facility: CLINIC | Age: 70
End: 2025-07-09

## 2025-07-09 NOTE — TELEPHONE ENCOUNTER
PATIENT ASKING IF DR DAI HAS ANY IMMEDIATE OPENINGS.  PATIENT HAVING SOME ISSUES WITH LEGS.  ANKLE IS SWOLLEN AND POSSIBLE GOUT ON FOOT.  ASKING DR DAI TO TAKE A QUICK LOOK AT HIS FOOT.

## 2025-07-09 NOTE — TELEPHONE ENCOUNTER
Patient's name and  verified     Patient experiencing right ankle and left middle toe swelling, redness and painful for a week. Patient worried about Gout    Patient also would like to discuss his sleep apnea.    Patient would like to get in sooner if possible, informed there are no openings  Future Appointments   Date Time Provider Department Center   7/15/2025 10:00 AM Erwin Rodriguez MD [facility] [place]   Okay to wait?  Please Advise

## 2025-07-09 NOTE — TELEPHONE ENCOUNTER
Unfortunately, I have nothing sooner. If joint pain is bad enough then go to IC.  I do not see sleep study results.     Thanks

## 2025-07-15 ENCOUNTER — OFFICE VISIT (OUTPATIENT)
Dept: FAMILY MEDICINE CLINIC | Facility: CLINIC | Age: 70
End: 2025-07-15
Payer: MEDICARE

## 2025-07-15 VITALS
HEIGHT: 66 IN | BODY MASS INDEX: 37.77 KG/M2 | SYSTOLIC BLOOD PRESSURE: 120 MMHG | DIASTOLIC BLOOD PRESSURE: 70 MMHG | TEMPERATURE: 98 F | WEIGHT: 235 LBS | OXYGEN SATURATION: 96 % | RESPIRATION RATE: 16 BRPM | HEART RATE: 68 BPM

## 2025-07-15 DIAGNOSIS — M25.571 ACUTE RIGHT ANKLE PAIN: ICD-10-CM

## 2025-07-15 DIAGNOSIS — E78.49 OTHER HYPERLIPIDEMIA: Primary | ICD-10-CM

## 2025-07-15 DIAGNOSIS — M79.675 TOE PAIN, CHRONIC, LEFT: ICD-10-CM

## 2025-07-15 DIAGNOSIS — G89.29 TOE PAIN, CHRONIC, LEFT: ICD-10-CM

## 2025-07-15 DIAGNOSIS — G47.30 SLEEP APNEA, UNSPECIFIED TYPE: ICD-10-CM

## 2025-07-15 PROBLEM — E66.01 SEVERE OBESITY (BMI 35.0-39.9) WITH COMORBIDITY (HCC): Chronic | Status: ACTIVE | Noted: 2025-07-15

## 2025-07-15 LAB
CRP SERPL-MCNC: 0.6 MG/DL (ref ?–0.5)
URATE SERPL-MCNC: 9.9 MG/DL (ref 3.7–9.2)

## 2025-07-15 PROCEDURE — 84550 ASSAY OF BLOOD/URIC ACID: CPT | Performed by: FAMILY MEDICINE

## 2025-07-15 PROCEDURE — 1170F FXNL STATUS ASSESSED: CPT | Performed by: FAMILY MEDICINE

## 2025-07-15 PROCEDURE — 3074F SYST BP LT 130 MM HG: CPT | Performed by: FAMILY MEDICINE

## 2025-07-15 PROCEDURE — 3008F BODY MASS INDEX DOCD: CPT | Performed by: FAMILY MEDICINE

## 2025-07-15 PROCEDURE — 99214 OFFICE O/P EST MOD 30 MIN: CPT | Performed by: FAMILY MEDICINE

## 2025-07-15 PROCEDURE — 86140 C-REACTIVE PROTEIN: CPT | Performed by: FAMILY MEDICINE

## 2025-07-15 PROCEDURE — 3078F DIAST BP <80 MM HG: CPT | Performed by: FAMILY MEDICINE

## 2025-07-15 PROCEDURE — 1160F RVW MEDS BY RX/DR IN RCRD: CPT | Performed by: FAMILY MEDICINE

## 2025-07-15 PROCEDURE — 1159F MED LIST DOCD IN RCRD: CPT | Performed by: FAMILY MEDICINE

## 2025-07-15 RX ORDER — INDOMETHACIN 75 MG/1
CAPSULE, EXTENDED RELEASE ORAL
Qty: 30 CAPSULE | Refills: 0 | Status: SHIPPED | OUTPATIENT
Start: 2025-07-15

## 2025-07-15 NOTE — PROGRESS NOTES
Calvin May is a 70 year old male.    CC:    Chief Complaint   Patient presents with    Gout     Left foot middle toe and right ankle       HPI:  Noted R ankle and L 2nd to pain, swelling and redness after 7/4. He admits to more alcohol and beef intake on the 4th. He has a son with gout. He takes BP med with hydrochlorothiazide, Has gained weight despite trying to eat better.    Noe stopped his Zetia due to joint pains and noted that his pain improved. He would like to recheck his lipids off the Zetia next month.     Would like to see someone for sleep apnea    Allergies as of 07/15/2025    (No Known Allergies)        Current Meds:  Current Outpatient Medications   Medication Sig Dispense Refill    indomethacin ER 75 MG Oral Cap CR Take one tab twice per day as needed for joint pain 30 capsule 0    Olmesartan Medoxomil-HCTZ 40-25 MG Oral Tab Take 1 tablet by mouth daily.      Ascorbic Acid (VITAMIN C) 100 MG Oral Tab Take 1 tablet (100 mg total) by mouth as needed.      vitamin B-12 50 MCG Oral Tab Take 1 tablet (50 mcg total) by mouth daily.      diphenhydrAMINE-APAP, sleep, (TYLENOL PM EXTRA STRENGTH)  MG Oral Tab Take 1 tablet by mouth nightly.      Sildenafil Citrate 25 MG Oral Tab Take 1 tablet (25 mg total) by mouth daily as needed for Erectile Dysfunction. 1-4 tabs qd prn  0        History:  Past Medical History:    Abnormal Heart Score CT    281.3 in 2019, 591 in 2024    Adenomatous colon polyp    Anesthesia complication    woke up during propofol > 20 yrs ago- during [facility]    Arthritis of facet joint of lumbar spine    CAD (coronary artery disease), native coronary artery    See CT heart score results    Calculus of kidney    right  kidney > 10 years ago    Difficult intubation    see airway status explanation    Diverticulosis of colon (without mention of hemorrhage)    Exposure to medical diagnostic radiation    completed in 2017    Facet arthritis of cervical region    History of  prostate cancer    s/p prostatectomy    History of SCC (squamous cell carcinoma) of skin     Forehead    Hx of motion sickness    on a boat    Hyperlipidemia    Hypertension    Left thyroid nodule     Biopsied 2019 per Dr. Yen    MGUS (monoclonal gammopathy of unknown significance)    Dr. 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    Arthroscopy, shoulder, surgi Left 02/17/2020    Rotator Cuff repair, Dr. Castelan    Cholecystectomy  2020    Colectomy      sigmoid, due to diverticular disease-? 2012    Colonoscopy N/A 01/16/2019    Procedure: COLONOSCOPY, POSSIBLE BIOPSY, POSSIBLE POLYPECTOMY 72522;  Surgeon: Roshan Horowitz DO;  Location: [facility]    Colonoscopy N/A 03/03/2021    Procedure: COLONOSCOPY, POSSIBLE BIOPSY, POSSIBLE POLYPECTOMY 78000;  Surgeon: Roshan Horowitz DO;  Location: [facility]    Colonoscopy N/A 1/24/2024    Procedure: COLONOSCOPY with HOT SNARE POLYPECTOMY;  Surgeon: Roshan Horowitz DO;  Location:  ENDOSCOPY    Colonoscopy,diagnostic N/A 03/09/2016    Procedure: COLONOSCOPY,POSSIBLE BIOPSY,POSSIBLE POLYPECTOMY;  Surgeon: Roshan Horowitz DO;  Location: [facility]    Ir epidural steriod injection      neck    Laparoscopic cholecystectomy  01/2021     Dr. Horowitz    Lysis of adhesions  01/2021     Dr. Horowitz, done at time of GB removal    Nephrectomy Left 2022    Partial    Other surgical history      prostatectomy-? [id number]    Other surgical history  11/04/2022    Laparoscopic robotic-assisted excision of left retroperitoneal mass    Spine surgery procedure unlisted      cervical fusion 4-7x2, lumbar fusions x1, laminectomies  x3    Umbilical hernia repair N/A 11/10/2014    Procedure: HERNIA UMBILICAL REPAIR ADULT;  Surgeon: Roshan Horowitz DO;  Location: [facility]    Vasectomy        Family History   Problem Relation Age of Onset    Hypertension Father     Cancer Mother         Breast/CA breast    Heart Disorder Mother     Breast Cancer Mother     Diabetes Mother     Other (Colon cancer) Mother     Breast Cancer Sister     Cancer Sister         CA breast    Cancer Paternal Grandmother         Colon cancer      Family Status   Relation Status    Fa  at age 76        Heart disease    Mo Alive    Sis (Not Specified)    PGMA (Not Specified)      Social History     Socioeconomic History    Marital status:    Tobacco Use    Smoking status: Former     Current packs/day: 0.00     Average packs/day: 0.5 packs/day for 4.0 years (2.0 ttl pk-yrs)     Types: Cigarettes     Start date: 2008     Quit date: 2012     Years since quittin.8     Passive exposure: Never    Smokeless tobacco: Never    Tobacco comments:     social   Vaping Use    Vaping status: Never Used   Substance and Sexual Activity    Alcohol use: Yes     Alcohol/week: 14.0 standard drinks of alcohol     Types: 7 Glasses of wine, 7 Standard drinks or equivalent per week     Comment: 1 glass of wine with dinner daily, and a cocktail before bed    Drug use: Not Currently     Types: Cannabis     Comment:  CBD gummies- for sleep as needed    Sexual activity: Yes     Partners: Female     Social Drivers of Health     Food Insecurity: No Food Insecurity (2025)    Received from [facility]    Food Insecurity     Currently or in the past 3 months, have you worried your food would run out before you had money to buy more?: No     In the past 12 months, have you run out of food or been unable to get more?: No   Transportation Needs: No Transportation Needs (2025)    Received from [facility]    Transportation Needs      Currently or in the past 3 months, has lack of transportation kept you from medical appointments, getting food or medicine, or providing care to a family member?: No     Medical Transportation Needs?: No    Received from [facility]    Housing Stability        ROS:  General: no fevers       Vitals: /70   Pulse 68   Temp 98.4 °F (36.9 °C) (Temporal)   Resp 16   Ht 5' 6\" (1.676 m)   Wt 235 lb (106.6 kg)   SpO2 96%   BMI 37.93 kg/m²    Reviewed by AMERICA Rodriguez M.D.    Physical Exam:  GEN: well developed, well nourished, in no apparent distress  EYE: B conjunctiva and lids normal  HENT: ---  NECK: No lymphadenopathy, thyromegaly or masses  NEURO: Awake and alert. Normal speech and articulation. No facial droop or asymmetry. Moving all extremities equally.  MS: R ankle and L 2nd toe with mild edema and post inflammatory changes    ASSESSMENT AND PLAN    1. Acute right ankle pain  Await results   Trial of Indocin  May need to take him off hydrochlorothiazide   - Uric Acid; Future  - C-Reactive Protein; Future  - VENIPUNCTURE  - Uric Acid  - C-Reactive Protein    2. Toe pain, chronic, left  As above   - Uric Acid; Future  - C-Reactive Protein; Future  - Uric Acid  - C-Reactive Protein    3. Other hyperlipidemia  Await results to be done in about 6 weeks  Zetia stopped due to arthralgia  - Lipid Panel [E]; Future    4. Sleep apnea  Consult sleep med    No follow-ups on file.    Orders for this visit:    Orders Placed This Encounter   Procedures    Lipid Panel [E]     Fax results to [phone]     Standing Status:   Future     Expected Date:   7/15/2025     Expiration Date:   7/15/2026     Release to patient:   Immediate    Uric Acid     Standing Status:   Future     Number of Occurrences:   1     Expected Date:   7/15/2025     Expiration Date:   7/15/2026    C-Reactive Protein     Standing Status:   Future     Number of Occurrences:   1     Expected Date:   7/15/2025     Expiration Date:    7/15/2026    VENIPUNCTURE     Release to patient:   Immediate       None    Meds & Refills for this Visit:  Requested Prescriptions     Signed Prescriptions Disp Refills    indomethacin ER 75 MG Oral Cap CR 30 capsule 0     Sig: Take one tab twice per day as needed for joint pain             Authorized by Erwin Rodriguez M.D.

## 2025-07-16 ENCOUNTER — RESULTS FOLLOW-UP (OUTPATIENT)
Dept: FAMILY MEDICINE CLINIC | Facility: CLINIC | Age: 70
End: 2025-07-16

## 2025-07-18 RX ORDER — OLMESARTAN MEDOXOMIL 40 MG/1
40 TABLET ORAL DAILY
Qty: 90 TABLET | Refills: 0 | Status: SHIPPED | OUTPATIENT
Start: 2025-07-18

## 2025-08-13 ENCOUNTER — TELEPHONE (OUTPATIENT)
Dept: FAMILY MEDICINE CLINIC | Facility: CLINIC | Age: 70
End: 2025-08-13

## 2025-08-14 ENCOUNTER — MED REC SCAN ONLY (OUTPATIENT)
Dept: FAMILY MEDICINE CLINIC | Facility: CLINIC | Age: 70
End: 2025-08-14

## (undated) DEVICE — 10FT COMBINED O2 DELIVERY/CO2 MONITORING. FILTER WITH MICROSTREAM TYPE LUER: Brand: DUAL ADULT NASAL CANNULA

## (undated) DEVICE — LARGE NEEDLE DRIVER: Brand: ENDOWRIST

## (undated) DEVICE — AIRSEAL 12 MM ACCESS PORT AND PALM GRIP OBTURATOR WITH BLADELESS OPTICAL TIP, 120 MM LENGTH: Brand: AIRSEAL

## (undated) DEVICE — STERILE SYNTHETIC POLYISOPRENE POWDER-FREE SURGICAL GLOVES WITH HYDROGEL COATING: Brand: PROTEXIS

## (undated) DEVICE — INSUFFLATION NEEDLE TO ESTABLISH PNEUMOPERITONEUM.: Brand: INSUFFLATION NEEDLE

## (undated) DEVICE — STERILE POLYISOPRENE POWDER-FREE SURGICAL GLOVES: Brand: PROTEXIS

## (undated) DEVICE — CAUTERY PENCIL

## (undated) DEVICE — TRAP POLYP W/ 2 SPEC TY CLR MAGNIFYING WIND

## (undated) DEVICE — ARM DRAPE

## (undated) DEVICE — CANNULA SEAL

## (undated) DEVICE — COLUMN DRAPE

## (undated) DEVICE — TROCARS: Brand: KII® BALLOON BLUNT TIP SYSTEM

## (undated) DEVICE — VIOLET BRAIDED (POLYGLACTIN 910), SYNTHETIC ABSORBABLE SUTURE: Brand: COATED VICRYL

## (undated) DEVICE — ZIPWIRE GUIDEWIRE .035X150 STR

## (undated) DEVICE — SOL H2O IV

## (undated) DEVICE — REM POLYHESIVE ADULT PATIENT RETURN ELECTRODE: Brand: VALLEYLAB

## (undated) DEVICE — FENESTRATED BIPOLAR FORCEPS: Brand: ENDOWRIST

## (undated) DEVICE — DISPOSABLE LAPAROSCOPIC CLIP APPLIER WITH 20 CLIPS.: Brand: EPIX® UNIVERSAL CLIP APPLIER

## (undated) DEVICE — SUT VICRYL 0 UR-6 J603H

## (undated) DEVICE — SUT VICRYL 3-0 SH J416H

## (undated) DEVICE — SUT VICRYL 0 J608H

## (undated) DEVICE — 3M™ RED DOT™ MONITORING ELECTRODE WITH FOAM TAPE AND STICKY GEL, 50/BAG, 20/CASE, 72/PLT 2570: Brand: RED DOT™

## (undated) DEVICE — KIT CUSTOM ENDOPROCEDURE STERIS

## (undated) DEVICE — 2, DISPOSABLE SUCTION/IRRIGATOR WITHOUT DISPOSABLE TIP: Brand: STRYKEFLOW

## (undated) DEVICE — TIGERTAIL 5F FLXTIP 70CM

## (undated) DEVICE — TRAY SURESTEP 16 BARDEX UMETR

## (undated) DEVICE — TROCAR: Brand: KII FIOS FIRST ENTRY

## (undated) DEVICE — KIT VLV 5 PC AIR H2O SUCT BX ENDOGATOR CONN

## (undated) DEVICE — BLADELESS OBTURATOR: Brand: WECK VISTA

## (undated) DEVICE — SUTURE MONOCRYL 4-0 PS-2

## (undated) DEVICE — TOWEL SURG OR 17X30IN BLUE

## (undated) DEVICE — CHLORAPREP 26ML APPLICATOR

## (undated) DEVICE — 40580 - THE PINK PAD - ADVANCED TRENDELENBURG POSITIONING KIT: Brand: 40580 - THE PINK PAD - ADVANCED TRENDELENBURG POSITIONING KIT

## (undated) DEVICE — TIP COVER ACCESSORY

## (undated) DEVICE — SPCMN DTCHBLE POUCH 5X7 500ML

## (undated) DEVICE — 1200CC GUARDIAN II: Brand: GUARDIAN

## (undated) DEVICE — ROBOTIC GENERAL: Brand: MEDLINE INDUSTRIES, INC.

## (undated) DEVICE — C-ARM: Brand: UNBRANDED

## (undated) DEVICE — TROCAR: Brand: KII® SLEEVE

## (undated) DEVICE — KENDALL SCD EXPRESS SLEEVES, KNEE LENGTH, MEDIUM: Brand: KENDALL SCD

## (undated) DEVICE — STRYKER HARMONIC 36CM REPRCSED

## (undated) DEVICE — PROGRASP FORCEPS: Brand: ENDOWRIST

## (undated) DEVICE — CLIP HEMOLOK LARGE PURPLE

## (undated) DEVICE — DRAPE HALF 40X58 DYNJP2410

## (undated) DEVICE — MONOPOLAR CURVED SCISSORS: Brand: ENDOWRIST

## (undated) DEVICE — LAP CHOLE/APPY CDS-LF: Brand: MEDLINE INDUSTRIES, INC.

## (undated) DEVICE — TRI-LUMEN FILTERED TUBE SET WITH ACTIVATED CHARCOAL FILTER: Brand: AIRSEAL

## (undated) DEVICE — VISUALIZATION SYSTEM: Brand: CLEARIFY

## (undated) DEVICE — SURGICEL SNOW ABS 4X4

## (undated) DEVICE — Device

## (undated) DEVICE — TISSUE RETRIEVAL SYSTEM: Brand: INZII RETRIEVAL SYSTEM

## (undated) DEVICE — #15 STERILE STAINLESS BLADE: Brand: STERILE STAINLESS BLADES

## (undated) DEVICE — CLOSURE EXOFIN 1.0ML

## (undated) DEVICE — LASSO POLYPECTOMY SNARE: Brand: LASSO

## (undated) DEVICE — SUT MONOCRYL 4-0 PS-2 Y496G

## (undated) NOTE — Clinical Note
mpression: Recent CAT scan shows a 1 cm increase in size of a lipoma of the right perinephric tissues. This was biopsied approximately 2 years ago and pathology sent to [facility].   Patient is concerned whether further work-up should be ensued for this s

## (undated) NOTE — Clinical Note
Abel Keith seen in the office today. He has a right groin abscess. I drained this in the office today. You have already started him on Augmentin. I will advised him on local wound care.   Thank you Will Sow

## (undated) NOTE — Clinical Note
Sravani Washington saw Vazquez Donald in the office today. He complains of right upper quadrant postprandial abdominal pain particularly after fatty meals he does have cholelithiasis on CT scan imaging. I am recommending laparoscopic cholecystectomy.   I do not have a source f

## (undated) NOTE — LETTER
OSR/ROBERT Notification    Patient Name: Mckenzie Verdugo CSN: [id number]  -Age / Sex: 1955-A: 78 y  male Medical Records: [id number]    Surgeon(s):  Surgeon(s):  Cecilia Guzman MD   Procedure: XI ROBOTIC ASSISTED LAPAROSCOPIC LEFT PARTIAL NEPHRECTOMY, EXCISION OF RENAL ANGIOMYOLIPOMA    Anesthesia Type: General Surgery Date: 22    During the pre-operative screening process using the STOP-Bang questionnaire, the patient listed above was identified as being at high risk for obstructive sleep apnea. If you feel it is appropriate to schedule a sleep study, the [facility] will give priority to patients scheduled for procedures to minimize surgical delays. If a sleep study is completed prior to surgery, please fax the results/plan of care to [facility]y Guadarrama ([phone]) as this information will be utilized in clinical decision-making regarding the patient's upcoming surgery. Thank you for your assistance in helping us provide a safe, seamless and personal experience for your patient.     Arnav Goodman MD, PhD  [facility]isha Morfin, Department of Anesthesia  [facility]isha Morfin, Sleep Apnea Task Force

## (undated) NOTE — Clinical Note
Ova Filter saw Alize Salima in the office today. He had multiple vague abdominal complaints. I am going to do a CT scan of his abdomen to rule out diverticulitis. I do think his tenesmus is from his radiation proctitis. However for his remaining abdominal complaints I am going to refer him to GI if the CT scan is negative.   Thank you Joana Nelson

## (undated) NOTE — LETTER
11/11/2019        [address] [facility] [address] [facility]      Dear Chauncey Rodgers.     To help us provide the highest quality medical care, [facility] uses a sophisticated computer system to track our patient records

## (undated) NOTE — LETTER
2024      RE: Calvin May  : 1955      To Whom it May Concern,      Calvin May is a patient of mine. He has a history of prostate cancer treated by prostatectomy. This has left him with need to urinate frequently. 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 This has required fixation surgeries. Due to the issues in his spine he can not remain stationary for prolonged periods of time due to pain escalation. Please consider these issue in the jury selection process.        Regards,             Erwin Rodriguez MD

## (undated) NOTE — LETTER
OSR/ROBERT Notification    Patient Name: Calvin May  -Age / Sex: 1955-A: 68 y  male  Medical Records: [id number] [facility]: [id number]    Surgeon(s):  Surgeon(s):  Roshan Horowitz DO   Procedure: COLONOSCOPY    Anesthesia Type: MAC Surgery Date: 2024    During the pre-operative screening process using the STOP-Bang questionnaire, the patient listed above was identified as being at high risk for obstructive sleep apnea.    If you feel it is appropriate to schedule a sleep study, the [facility] will give priority to patients scheduled for procedures to minimize surgical delays.     If a sleep study is completed prior to surgery, please fax the results/plan of care to Pre-Admission Testing ([phone]) as this information will be utilized in clinical decision-making regarding the patient's upcoming surgery.    Thank you for your assistance in helping us provide a safe, seamless and personal experience for your patient.    Jarek Parry MD  , Department of Anesthesia